# Patient Record
Sex: MALE | Race: WHITE | Employment: FULL TIME | URBAN - NONMETROPOLITAN AREA
[De-identification: names, ages, dates, MRNs, and addresses within clinical notes are randomized per-mention and may not be internally consistent; named-entity substitution may affect disease eponyms.]

---

## 2019-01-14 PROBLEM — Z12.11 COLON CANCER SCREENING: Status: ACTIVE | Noted: 2019-01-14

## 2019-02-13 ENCOUNTER — HOSPITAL ENCOUNTER (OUTPATIENT)
Age: 54
Discharge: HOME | End: 2019-02-13
Payer: COMMERCIAL

## 2019-02-13 PROBLEM — Z12.11 COLON CANCER SCREENING: Status: RESOLVED | Noted: 2019-01-14 | Resolved: 2019-02-13

## 2020-01-06 ENCOUNTER — HOSPITAL ENCOUNTER (OUTPATIENT)
Dept: NON INVASIVE DIAGNOSTICS | Age: 55
Discharge: HOME | End: 2020-01-06
Payer: COMMERCIAL

## 2020-01-06 ENCOUNTER — HOSPITAL ENCOUNTER (OUTPATIENT)
Dept: LAB | Age: 55
Discharge: HOME | End: 2020-01-06
Payer: COMMERCIAL

## 2020-01-06 LAB
ABSOLUTE EOS #: 0.1 K/UL (ref 0–0.44)
ABSOLUTE IMMATURE GRANULOCYTE: 0.04 K/UL (ref 0–0.3)
ABSOLUTE LYMPH #: 1.44 K/UL (ref 1.1–3.7)
ABSOLUTE MONO #: 0.48 K/UL (ref 0.1–1.2)
ANION GAP SERPL CALCULATED.3IONS-SCNC: 15 MMOL/L (ref 9–17)
BASOPHILS # BLD: 0 % (ref 0–2)
BASOPHILS ABSOLUTE: <0.03 K/UL (ref 0–0.2)
BUN BLDV-MCNC: 17 MG/DL (ref 6–20)
BUN/CREAT BLD: 20 (ref 9–20)
CALCIUM SERPL-MCNC: 9.6 MG/DL (ref 8.6–10.4)
CHLORIDE BLD-SCNC: 99 MMOL/L (ref 98–107)
CO2: 25 MMOL/L (ref 20–31)
CREAT SERPL-MCNC: 0.83 MG/DL (ref 0.7–1.2)
DIFFERENTIAL TYPE: ABNORMAL
EKG ATRIAL RATE: 74 BPM
EKG P AXIS: 31 DEGREES
EKG P-R INTERVAL: 172 MS
EKG Q-T INTERVAL: 382 MS
EKG QRS DURATION: 124 MS
EKG QTC CALCULATION (BAZETT): 424 MS
EKG R AXIS: -17 DEGREES
EKG T AXIS: 13 DEGREES
EKG VENTRICULAR RATE: 74 BPM
EOSINOPHILS RELATIVE PERCENT: 2 % (ref 1–4)
GFR AFRICAN AMERICAN: >60 ML/MIN
GFR NON-AFRICAN AMERICAN: >60 ML/MIN
GFR SERPL CREATININE-BSD FRML MDRD: ABNORMAL ML/MIN/{1.73_M2}
GFR SERPL CREATININE-BSD FRML MDRD: ABNORMAL ML/MIN/{1.73_M2}
GLUCOSE BLD-MCNC: 112 MG/DL (ref 70–99)
HCT VFR BLD CALC: 46 % (ref 40.7–50.3)
HEMOGLOBIN: 14.9 G/DL (ref 13–17)
IMMATURE GRANULOCYTES: 1 %
LYMPHOCYTES # BLD: 31 % (ref 24–43)
MCH RBC QN AUTO: 28.6 PG (ref 25.2–33.5)
MCHC RBC AUTO-ENTMCNC: 32.4 G/DL (ref 28.4–34.8)
MCV RBC AUTO: 88.3 FL (ref 82.6–102.9)
MONOCYTES # BLD: 10 % (ref 3–12)
NRBC AUTOMATED: 0 PER 100 WBC
PDW BLD-RTO: 13.2 % (ref 11.8–14.4)
PLATELET # BLD: 179 K/UL (ref 138–453)
PLATELET ESTIMATE: ABNORMAL
PMV BLD AUTO: 9.4 FL (ref 8.1–13.5)
POTASSIUM SERPL-SCNC: 4.7 MMOL/L (ref 3.7–5.3)
RBC # BLD: 5.21 M/UL (ref 4.21–5.77)
RBC # BLD: ABNORMAL 10*6/UL
SEG NEUTROPHILS: 56 % (ref 36–65)
SEGMENTED NEUTROPHILS ABSOLUTE COUNT: 2.54 K/UL (ref 1.5–8.1)
SODIUM BLD-SCNC: 139 MMOL/L (ref 135–144)
WBC # BLD: 4.6 K/UL (ref 3.5–11.3)
WBC # BLD: ABNORMAL 10*3/UL

## 2020-01-06 PROCEDURE — 36415 COLL VENOUS BLD VENIPUNCTURE: CPT

## 2020-01-06 PROCEDURE — 93010 ELECTROCARDIOGRAM REPORT: CPT | Performed by: FAMILY MEDICINE

## 2020-01-06 PROCEDURE — 80048 BASIC METABOLIC PNL TOTAL CA: CPT

## 2020-01-06 PROCEDURE — 93005 ELECTROCARDIOGRAM TRACING: CPT | Performed by: ORTHOPAEDIC SURGERY

## 2020-01-06 PROCEDURE — 85025 COMPLETE CBC W/AUTO DIFF WBC: CPT

## 2020-07-20 ENCOUNTER — HOSPITAL ENCOUNTER (OUTPATIENT)
Dept: LAB | Age: 55
Setting detail: SPECIMEN
Discharge: HOME | End: 2020-07-20
Payer: COMMERCIAL

## 2020-07-20 PROCEDURE — U0003 INFECTIOUS AGENT DETECTION BY NUCLEIC ACID (DNA OR RNA); SEVERE ACUTE RESPIRATORY SYNDROME CORONAVIRUS 2 (SARS-COV-2) (CORONAVIRUS DISEASE [COVID-19]), AMPLIFIED PROBE TECHNIQUE, MAKING USE OF HIGH THROUGHPUT TECHNOLOGIES AS DESCRIBED BY CMS-2020-01-R: HCPCS

## 2020-07-25 LAB — SARS-COV-2, NAA: NOT DETECTED

## 2020-07-26 ENCOUNTER — TELEPHONE (OUTPATIENT)
Dept: PRIMARY CARE CLINIC | Age: 55
End: 2020-07-26

## 2020-11-10 ENCOUNTER — HOSPITAL ENCOUNTER (OUTPATIENT)
Age: 55
Discharge: HOME | End: 2020-11-10
Payer: COMMERCIAL

## 2020-11-10 LAB
ABSOLUTE EOS #: 0.13 K/UL (ref 0–0.44)
ABSOLUTE IMMATURE GRANULOCYTE: 0.03 K/UL (ref 0–0.3)
ABSOLUTE LYMPH #: 1.42 K/UL (ref 1.1–3.7)
ABSOLUTE MONO #: 0.46 K/UL (ref 0.1–1.2)
ANION GAP SERPL CALCULATED.3IONS-SCNC: 11 MMOL/L (ref 9–17)
BASOPHILS # BLD: 1 % (ref 0–2)
BASOPHILS ABSOLUTE: 0.04 K/UL (ref 0–0.2)
BUN BLDV-MCNC: 13 MG/DL (ref 6–20)
BUN/CREAT BLD: 15 (ref 9–20)
CALCIUM SERPL-MCNC: 9.5 MG/DL (ref 8.6–10.4)
CHLORIDE BLD-SCNC: 99 MMOL/L (ref 98–107)
CO2: 24 MMOL/L (ref 20–31)
CREAT SERPL-MCNC: 0.88 MG/DL (ref 0.7–1.2)
DIFFERENTIAL TYPE: ABNORMAL
EKG ATRIAL RATE: 74 BPM
EKG P AXIS: 29 DEGREES
EKG P-R INTERVAL: 164 MS
EKG Q-T INTERVAL: 384 MS
EKG QRS DURATION: 114 MS
EKG QTC CALCULATION (BAZETT): 426 MS
EKG R AXIS: -17 DEGREES
EKG T AXIS: 14 DEGREES
EKG VENTRICULAR RATE: 74 BPM
EOSINOPHILS RELATIVE PERCENT: 3 % (ref 1–4)
GFR AFRICAN AMERICAN: >60 ML/MIN
GFR NON-AFRICAN AMERICAN: >60 ML/MIN
GFR SERPL CREATININE-BSD FRML MDRD: ABNORMAL ML/MIN/{1.73_M2}
GFR SERPL CREATININE-BSD FRML MDRD: ABNORMAL ML/MIN/{1.73_M2}
GLUCOSE BLD-MCNC: 108 MG/DL (ref 70–99)
HCT VFR BLD CALC: 45.1 % (ref 40.7–50.3)
HEMOGLOBIN: 14.8 G/DL (ref 13–17)
IMMATURE GRANULOCYTES: 1 %
LYMPHOCYTES # BLD: 30 % (ref 24–43)
MCH RBC QN AUTO: 28.7 PG (ref 25.2–33.5)
MCHC RBC AUTO-ENTMCNC: 32.8 G/DL (ref 28.4–34.8)
MCV RBC AUTO: 87.6 FL (ref 82.6–102.9)
MONOCYTES # BLD: 10 % (ref 3–12)
NRBC AUTOMATED: 0 PER 100 WBC
PDW BLD-RTO: 13.3 % (ref 11.8–14.4)
PLATELET # BLD: 203 K/UL (ref 138–453)
PLATELET ESTIMATE: ABNORMAL
PMV BLD AUTO: 8.9 FL (ref 8.1–13.5)
POTASSIUM SERPL-SCNC: 4.2 MMOL/L (ref 3.7–5.3)
RBC # BLD: 5.15 M/UL (ref 4.21–5.77)
RBC # BLD: ABNORMAL 10*6/UL
SEG NEUTROPHILS: 55 % (ref 36–65)
SEGMENTED NEUTROPHILS ABSOLUTE COUNT: 2.61 K/UL (ref 1.5–8.1)
SODIUM BLD-SCNC: 134 MMOL/L (ref 135–144)
WBC # BLD: 4.7 K/UL (ref 3.5–11.3)
WBC # BLD: ABNORMAL 10*3/UL

## 2020-11-10 PROCEDURE — 85025 COMPLETE CBC W/AUTO DIFF WBC: CPT

## 2020-11-10 PROCEDURE — 93010 ELECTROCARDIOGRAM REPORT: CPT | Performed by: INTERNAL MEDICINE

## 2020-11-10 PROCEDURE — 93005 ELECTROCARDIOGRAM TRACING: CPT | Performed by: ORTHOPAEDIC SURGERY

## 2020-11-10 PROCEDURE — 87641 MR-STAPH DNA AMP PROBE: CPT

## 2020-11-10 PROCEDURE — 80048 BASIC METABOLIC PNL TOTAL CA: CPT

## 2020-11-10 PROCEDURE — 36415 COLL VENOUS BLD VENIPUNCTURE: CPT

## 2020-11-11 LAB
MRSA, DNA, NASAL: NORMAL
SPECIMEN DESCRIPTION: NORMAL

## 2021-10-13 ENCOUNTER — APPOINTMENT (OUTPATIENT)
Dept: GENERAL RADIOLOGY | Age: 56
DRG: 200 | End: 2021-10-13
Payer: COMMERCIAL

## 2021-10-13 ENCOUNTER — APPOINTMENT (OUTPATIENT)
Dept: CT IMAGING | Age: 56
DRG: 200 | End: 2021-10-13
Payer: COMMERCIAL

## 2021-10-13 ENCOUNTER — HOSPITAL ENCOUNTER (INPATIENT)
Age: 56
LOS: 4 days | Discharge: HOME | DRG: 200 | End: 2021-10-17
Attending: EMERGENCY MEDICINE | Admitting: SURGERY
Payer: COMMERCIAL

## 2021-10-13 DIAGNOSIS — S27.0XXA TRAUMATIC PNEUMOTHORAX, INITIAL ENCOUNTER: ICD-10-CM

## 2021-10-13 DIAGNOSIS — S42.021A CLOSED DISPLACED FRACTURE OF SHAFT OF RIGHT CLAVICLE, INITIAL ENCOUNTER: ICD-10-CM

## 2021-10-13 DIAGNOSIS — S42.112A CLOSED DISPLACED FRACTURE OF BODY OF LEFT SCAPULA, INITIAL ENCOUNTER: ICD-10-CM

## 2021-10-13 DIAGNOSIS — J98.2 PNEUMOMEDIASTINUM (HCC): Primary | ICD-10-CM

## 2021-10-13 PROBLEM — S42.102A FRACTURE OF LEFT SCAPULA: Status: ACTIVE | Noted: 2021-10-13

## 2021-10-13 PROBLEM — T14.90XA TRAUMA: Status: ACTIVE | Noted: 2021-10-13

## 2021-10-13 PROBLEM — S22.42XA FRACTURE OF MULTIPLE RIBS OF LEFT SIDE: Status: ACTIVE | Noted: 2021-10-13

## 2021-10-13 PROBLEM — S02.92XA FACIAL FRACTURE (HCC): Status: ACTIVE | Noted: 2021-10-13

## 2021-10-13 PROBLEM — S42.001A FRACTURE OF RIGHT CLAVICLE: Status: ACTIVE | Noted: 2021-10-13

## 2021-10-13 PROCEDURE — 71045 X-RAY EXAM CHEST 1 VIEW: CPT

## 2021-10-13 PROCEDURE — 92523 SPEECH SOUND LANG COMPREHEN: CPT

## 2021-10-13 PROCEDURE — 6370000000 HC RX 637 (ALT 250 FOR IP): Performed by: STUDENT IN AN ORGANIZED HEALTH CARE EDUCATION/TRAINING PROGRAM

## 2021-10-13 PROCEDURE — 6360000002 HC RX W HCPCS

## 2021-10-13 PROCEDURE — 0W9B30Z DRAINAGE OF LEFT PLEURAL CAVITY WITH DRAINAGE DEVICE, PERCUTANEOUS APPROACH: ICD-10-PCS | Performed by: SURGERY

## 2021-10-13 PROCEDURE — 73200 CT UPPER EXTREMITY W/O DYE: CPT

## 2021-10-13 PROCEDURE — 6360000002 HC RX W HCPCS: Performed by: STUDENT IN AN ORGANIZED HEALTH CARE EDUCATION/TRAINING PROGRAM

## 2021-10-13 PROCEDURE — 2500000003 HC RX 250 WO HCPCS: Performed by: STUDENT IN AN ORGANIZED HEALTH CARE EDUCATION/TRAINING PROGRAM

## 2021-10-13 PROCEDURE — 2580000003 HC RX 258: Performed by: STUDENT IN AN ORGANIZED HEALTH CARE EDUCATION/TRAINING PROGRAM

## 2021-10-13 PROCEDURE — 73030 X-RAY EXAM OF SHOULDER: CPT

## 2021-10-13 PROCEDURE — 2060000002 HC BURN ICU INTERMEDIATE R&B

## 2021-10-13 PROCEDURE — 73000 X-RAY EXAM OF COLLAR BONE: CPT

## 2021-10-13 PROCEDURE — 76376 3D RENDER W/INTRP POSTPROCES: CPT

## 2021-10-13 PROCEDURE — 99284 EMERGENCY DEPT VISIT MOD MDM: CPT

## 2021-10-13 RX ORDER — ONDANSETRON 2 MG/ML
4 INJECTION INTRAMUSCULAR; INTRAVENOUS EVERY 6 HOURS PRN
Status: DISCONTINUED | OUTPATIENT
Start: 2021-10-13 | End: 2021-10-17 | Stop reason: HOSPADM

## 2021-10-13 RX ORDER — OXYCODONE HYDROCHLORIDE 5 MG/1
5 TABLET ORAL EVERY 8 HOURS PRN
Status: DISCONTINUED | OUTPATIENT
Start: 2021-10-13 | End: 2021-10-17

## 2021-10-13 RX ORDER — FENTANYL CITRATE 50 UG/ML
INJECTION, SOLUTION INTRAMUSCULAR; INTRAVENOUS
Status: COMPLETED
Start: 2021-10-13 | End: 2021-10-13

## 2021-10-13 RX ORDER — FENTANYL CITRATE 50 UG/ML
50 INJECTION, SOLUTION INTRAMUSCULAR; INTRAVENOUS ONCE
Status: COMPLETED | OUTPATIENT
Start: 2021-10-13 | End: 2021-10-13

## 2021-10-13 RX ORDER — AMOXICILLIN AND CLAVULANATE POTASSIUM 875; 125 MG/1; MG/1
1 TABLET, FILM COATED ORAL EVERY 12 HOURS SCHEDULED
Status: DISCONTINUED | OUTPATIENT
Start: 2021-10-13 | End: 2021-10-17 | Stop reason: HOSPADM

## 2021-10-13 RX ORDER — SODIUM CHLORIDE 0.9 % (FLUSH) 0.9 %
5-40 SYRINGE (ML) INJECTION PRN
Status: DISCONTINUED | OUTPATIENT
Start: 2021-10-13 | End: 2021-10-17 | Stop reason: HOSPADM

## 2021-10-13 RX ORDER — ONDANSETRON 4 MG/1
4 TABLET, ORALLY DISINTEGRATING ORAL EVERY 8 HOURS PRN
Status: DISCONTINUED | OUTPATIENT
Start: 2021-10-13 | End: 2021-10-17 | Stop reason: HOSPADM

## 2021-10-13 RX ORDER — METHOCARBAMOL 500 MG/1
750 TABLET, FILM COATED ORAL EVERY 8 HOURS
Status: DISCONTINUED | OUTPATIENT
Start: 2021-10-13 | End: 2021-10-17 | Stop reason: HOSPADM

## 2021-10-13 RX ORDER — GABAPENTIN 300 MG/1
300 CAPSULE ORAL EVERY 8 HOURS
Status: DISCONTINUED | OUTPATIENT
Start: 2021-10-13 | End: 2021-10-17 | Stop reason: HOSPADM

## 2021-10-13 RX ORDER — SODIUM CHLORIDE 9 MG/ML
25 INJECTION, SOLUTION INTRAVENOUS PRN
Status: DISCONTINUED | OUTPATIENT
Start: 2021-10-13 | End: 2021-10-17 | Stop reason: HOSPADM

## 2021-10-13 RX ORDER — POLYETHYLENE GLYCOL 3350 17 G/17G
17 POWDER, FOR SOLUTION ORAL DAILY
Status: DISCONTINUED | OUTPATIENT
Start: 2021-10-13 | End: 2021-10-17 | Stop reason: HOSPADM

## 2021-10-13 RX ORDER — SODIUM CHLORIDE 0.9 % (FLUSH) 0.9 %
5-40 SYRINGE (ML) INJECTION EVERY 12 HOURS SCHEDULED
Status: DISCONTINUED | OUTPATIENT
Start: 2021-10-13 | End: 2021-10-17 | Stop reason: HOSPADM

## 2021-10-13 RX ORDER — ACETAMINOPHEN 500 MG
1000 TABLET ORAL EVERY 8 HOURS
Status: DISCONTINUED | OUTPATIENT
Start: 2021-10-13 | End: 2021-10-17 | Stop reason: HOSPADM

## 2021-10-13 RX ADMIN — GABAPENTIN 300 MG: 300 CAPSULE ORAL at 11:48

## 2021-10-13 RX ADMIN — METHOCARBAMOL TABLETS 750 MG: 500 TABLET, COATED ORAL at 22:07

## 2021-10-13 RX ADMIN — KETAMINE HYDROCHLORIDE 0.2 MG/KG/HR: 50 INJECTION INTRAMUSCULAR; INTRAVENOUS at 04:56

## 2021-10-13 RX ADMIN — GABAPENTIN 300 MG: 300 CAPSULE ORAL at 04:33

## 2021-10-13 RX ADMIN — SODIUM CHLORIDE, PRESERVATIVE FREE 10 ML: 5 INJECTION INTRAVENOUS at 20:06

## 2021-10-13 RX ADMIN — METHOCARBAMOL TABLETS 750 MG: 500 TABLET, COATED ORAL at 04:33

## 2021-10-13 RX ADMIN — FENTANYL CITRATE 50 MCG: 50 INJECTION, SOLUTION INTRAMUSCULAR; INTRAVENOUS at 18:00

## 2021-10-13 RX ADMIN — FENTANYL CITRATE 50 MCG: 50 INJECTION, SOLUTION INTRAMUSCULAR; INTRAVENOUS at 19:53

## 2021-10-13 RX ADMIN — AMOXICILLIN AND CLAVULANATE POTASSIUM 1 TABLET: 875; 125 TABLET, FILM COATED ORAL at 11:47

## 2021-10-13 RX ADMIN — METHOCARBAMOL TABLETS 750 MG: 500 TABLET, COATED ORAL at 11:47

## 2021-10-13 RX ADMIN — GABAPENTIN 300 MG: 300 CAPSULE ORAL at 22:06

## 2021-10-13 RX ADMIN — ACETAMINOPHEN 1000 MG: 500 TABLET ORAL at 22:07

## 2021-10-13 RX ADMIN — FENTANYL CITRATE 50 MCG: 50 INJECTION INTRAMUSCULAR; INTRAVENOUS at 20:05

## 2021-10-13 RX ADMIN — AMOXICILLIN AND CLAVULANATE POTASSIUM 1 TABLET: 875; 125 TABLET, FILM COATED ORAL at 23:06

## 2021-10-13 RX ADMIN — ACETAMINOPHEN 1000 MG: 500 TABLET ORAL at 04:33

## 2021-10-13 ASSESSMENT — PAIN SCALES - GENERAL
PAINLEVEL_OUTOF10: 8
PAINLEVEL_OUTOF10: 8
PAINLEVEL_OUTOF10: 6
PAINLEVEL_OUTOF10: 8
PAINLEVEL_OUTOF10: 4

## 2021-10-13 ASSESSMENT — PAIN DESCRIPTION - ORIENTATION: ORIENTATION: LEFT;RIGHT

## 2021-10-13 ASSESSMENT — PAIN DESCRIPTION - LOCATION: LOCATION: RIB CAGE

## 2021-10-13 ASSESSMENT — PAIN DESCRIPTION - PROGRESSION: CLINICAL_PROGRESSION: NOT CHANGED

## 2021-10-13 ASSESSMENT — PAIN DESCRIPTION - FREQUENCY: FREQUENCY: CONTINUOUS

## 2021-10-13 ASSESSMENT — PAIN DESCRIPTION - PAIN TYPE: TYPE: ACUTE PAIN

## 2021-10-13 ASSESSMENT — PAIN DESCRIPTION - ONSET: ONSET: GRADUAL

## 2021-10-13 ASSESSMENT — PAIN DESCRIPTION - DESCRIPTORS: DESCRIPTORS: ACHING

## 2021-10-13 NOTE — PROGRESS NOTES
Therapy Prognosis  Prognosis: Good  Individuals consulted  Consulted and agree with results and recommendations: Patient; Family member    Education:  Patient Education: Yes  Patient Education Response: Verbalizes understanding          Therapy Time:   Individual Concurrent Group Co-treatment   Time In 2268         Time Out 9514         Minutes 11                Completed by: Gregory Rabago  Clinician    Cosigned By: Kerrie Low A.CCC/SLP       10/13/2021 12:23 PM

## 2021-10-13 NOTE — FLOWSHEET NOTE
Trauma   Assessment Approachable;Coping;Helplessness   Intervention Active listening;Explored feelings, thoughts, concerns;Explored coping resources;Nurtured hope;Sustaining presence/ Ministry of presence; Discussed belief system/Rastafari practices/carlos;Discussed illness/injury and it's impact   Outcome Comfort;Expressed gratitude;Engaged in conversation;Coping;Receptive   Spiritual/Yarsani   Type Spiritual support     Electronically signed by Jamey Pride on 10/13/2021 at 8:00 AM.  [facility]  [phone]

## 2021-10-13 NOTE — PROGRESS NOTES
CT scan reviewed. Lt thoracic cavity with increasing pneumo, fluid, as well as sub-q air. Will place chest tube. Discussed with team and patient.
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
## 2021-10-13 NOTE — PROCEDURES
PROCEDURE NOTE - CHEST TUBE PLACEMENT     PATIENT NAME: Sandeep Mejias [facility]  MEDICAL RECORD NO. [id number]  DATE: 10/13/2021  ATTENDING PHYSICIAN: Tiana    PREOPERATIVE DIAGNOSIS:  pneumothorax  POSTOPERATIVE DIAGNOSIS:  Same  PROCEDURE PERFORMED:  Emergency Chest Tube insertion  PERFORMING PHYSICIAN: Kiel Beal MD  COMPLICATIONS:  None      DISCUSSION:  Ashley Alvarenga is a 49y.o.-year-old male who requires chest tube placement due to  pneumothorax. The history and physical examination were reviewed and confirmed. CONSENT: The patient was counseled regarding the procedure, its indications, risks, potential complications and alternatives, and any questions were answered. Consent was obtained to proceed. PROCEDURE:  The patient was placed in a semirecumbent position with the head of the bed at 30 degrees. The left side was prepped with betadine and draped in a sterile fashion. Local anesthesia over the insertion site was obtained by infiltration using 1% Lidocaine without epinephrine. An incision was made laterally in the midaxillary line. Blunt dissection up and over the rib was performed until access was obtained into the pleural cavity. A 28 Thai chest tube was placed and connected to suction. Initial output from the tube was air. The tube was sutured in place and the site was covered with an occlusive dressing. All connections were banded. Breath sounds after the procedure were normal.  A chest x-ray was obtained to evaluate placement which was pending, but showed poor placement of chest tube. Chest tube removed, another incision made superiorly and a new 28 Thai tube was placed showing suboptimal position but functional.    The patient tolerated the procedure well. Kiel Beal MD  6:23 PM, 10/13/21    Present for procedure. F/U CXR pending. Addendum:  CXR reviewed chest tube appears to be out of chest.  Will replace and recheck CXR.     XR CHEST PORTABLE   Final Result Small bilateral effusions with left apical pneumothorax and left basilar   chest tube. Subcutaneous emphysema along the left lateral chest wall and in the neck   region bilaterally. XR CHEST PORTABLE   Final Result   Malposition chest tube removed from the left. Other 2 chest tubes on the   left unchanged. Extensive subcutaneous emphysema unchanged. No definite   pneumothorax. Mild interstitial infiltrates and small left effusion. Multiple rib fractures on the left and right clavicular fracture. XR CHEST PORTABLE   Final Result   Chest tube outside the chest wall cavity on the left. Questionable minute   left apical pneumothorax. Extensive subcutaneous emphysema decreases   sensitivity. Possible mild edema or pneumonitis. CT 3D RECONSTRUCTION   Final Result   1. Moderate left-sided pneumothorax with left apical, anterior, anterior   inferior, lateral and inferior components. Moderate left-sided   hydropneumothorax with moderate pleural fluid in the inferior left pleural   space. Compressive atelectasis in the left lower lobe, posterior left upper   lobe and lingula. Concurrent/underlying pulmonary contusion is also possible. 2. Multiple acute displaced left-sided rib fracture deformities involving the   anterior, anterolateral, lateral, posterolateral and posterior ribs. 3. Severely comminuted fracture at the junction of the left glenoid and   scapular body with extension of fracture components into the lateral left   scapular body. 4. Extensive subcutaneous emphysema throughout the left neck, chest wall and   left axilla. Pneumomediastinum. 5. Mild degenerative changes of the left AC and sternoclavicular joints. 6. Moderate to severe left glenohumeral osteoarthrosis. CT SHOULDER LEFT WO CONTRAST   Final Result   1. Moderate left-sided pneumothorax with left apical, anterior, anterior   inferior, lateral and inferior components.   Moderate left-sided fractures. Soft tissue emphysema at the base of the neck and left lateral chest wall. Likely left upper lung contusion not mentioned above. XR CHEST PORTABLE   Final Result   Possible left basilar pneumothorax on this supine image. Multiple visualized and suspected left rib fractures as well as comminuted   left scapular fracture. Soft tissue emphysema throughout the visualized base of neck and mid to upper   left chest wall.
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
## 2021-10-14 ENCOUNTER — APPOINTMENT (OUTPATIENT)
Dept: GENERAL RADIOLOGY | Age: 56
DRG: 200 | End: 2021-10-14
Payer: COMMERCIAL

## 2021-10-14 LAB
ABSOLUTE EOS #: <0.03 K/UL (ref 0–0.44)
ABSOLUTE IMMATURE GRANULOCYTE: <0.03 K/UL (ref 0–0.3)
ABSOLUTE LYMPH #: 0.97 K/UL (ref 1.1–3.7)
ABSOLUTE MONO #: 0.91 K/UL (ref 0.1–1.2)
ANION GAP SERPL CALCULATED.3IONS-SCNC: 11 MMOL/L (ref 9–17)
BASOPHILS # BLD: 0 % (ref 0–2)
BASOPHILS ABSOLUTE: 0.03 K/UL (ref 0–0.2)
BUN BLDV-MCNC: 11 MG/DL (ref 6–20)
BUN/CREAT BLD: ABNORMAL (ref 9–20)
CALCIUM SERPL-MCNC: 8.5 MG/DL (ref 8.6–10.4)
CHLORIDE BLD-SCNC: 104 MMOL/L (ref 98–107)
CO2: 21 MMOL/L (ref 20–31)
CREAT SERPL-MCNC: 0.7 MG/DL (ref 0.7–1.2)
DIFFERENTIAL TYPE: ABNORMAL
EOSINOPHILS RELATIVE PERCENT: 0 % (ref 1–4)
GFR AFRICAN AMERICAN: >60 ML/MIN
GFR NON-AFRICAN AMERICAN: >60 ML/MIN
GFR SERPL CREATININE-BSD FRML MDRD: ABNORMAL ML/MIN/{1.73_M2}
GFR SERPL CREATININE-BSD FRML MDRD: ABNORMAL ML/MIN/{1.73_M2}
GLUCOSE BLD-MCNC: 121 MG/DL (ref 70–99)
HCT VFR BLD CALC: 41.4 % (ref 40.7–50.3)
HEMOGLOBIN: 13.4 G/DL (ref 13–17)
IMMATURE GRANULOCYTES: 0 %
LYMPHOCYTES # BLD: 13 % (ref 24–43)
MCH RBC QN AUTO: 30.9 PG (ref 25.2–33.5)
MCHC RBC AUTO-ENTMCNC: 32.4 G/DL (ref 28.4–34.8)
MCV RBC AUTO: 95.6 FL (ref 82.6–102.9)
MONOCYTES # BLD: 12 % (ref 3–12)
NRBC AUTOMATED: 0 PER 100 WBC
PDW BLD-RTO: 13.7 % (ref 11.8–14.4)
PLATELET # BLD: 261 K/UL (ref 138–453)
PLATELET ESTIMATE: ABNORMAL
PMV BLD AUTO: 10.1 FL (ref 8.1–13.5)
POTASSIUM SERPL-SCNC: 4.2 MMOL/L (ref 3.7–5.3)
RBC # BLD: 4.33 M/UL (ref 4.21–5.77)
RBC # BLD: ABNORMAL 10*6/UL
SEG NEUTROPHILS: 75 % (ref 36–65)
SEGMENTED NEUTROPHILS ABSOLUTE COUNT: 5.74 K/UL (ref 1.5–8.1)
SODIUM BLD-SCNC: 136 MMOL/L (ref 135–144)
VITAMIN D 25-HYDROXY: 41.1 NG/ML (ref 30–100)
WBC # BLD: 7.7 K/UL (ref 3.5–11.3)
WBC # BLD: ABNORMAL 10*3/UL

## 2021-10-14 PROCEDURE — 97530 THERAPEUTIC ACTIVITIES: CPT

## 2021-10-14 PROCEDURE — 2580000003 HC RX 258: Performed by: STUDENT IN AN ORGANIZED HEALTH CARE EDUCATION/TRAINING PROGRAM

## 2021-10-14 PROCEDURE — 71045 X-RAY EXAM CHEST 1 VIEW: CPT

## 2021-10-14 PROCEDURE — 6360000002 HC RX W HCPCS

## 2021-10-14 PROCEDURE — 6370000000 HC RX 637 (ALT 250 FOR IP): Performed by: STUDENT IN AN ORGANIZED HEALTH CARE EDUCATION/TRAINING PROGRAM

## 2021-10-14 PROCEDURE — 97166 OT EVAL MOD COMPLEX 45 MIN: CPT

## 2021-10-14 PROCEDURE — 36415 COLL VENOUS BLD VENIPUNCTURE: CPT

## 2021-10-14 PROCEDURE — 82306 VITAMIN D 25 HYDROXY: CPT

## 2021-10-14 PROCEDURE — 97535 SELF CARE MNGMENT TRAINING: CPT

## 2021-10-14 PROCEDURE — 97162 PT EVAL MOD COMPLEX 30 MIN: CPT

## 2021-10-14 PROCEDURE — 2060000002 HC BURN ICU INTERMEDIATE R&B

## 2021-10-14 PROCEDURE — 85025 COMPLETE CBC W/AUTO DIFF WBC: CPT

## 2021-10-14 PROCEDURE — 2500000003 HC RX 250 WO HCPCS: Performed by: STUDENT IN AN ORGANIZED HEALTH CARE EDUCATION/TRAINING PROGRAM

## 2021-10-14 PROCEDURE — 80048 BASIC METABOLIC PNL TOTAL CA: CPT

## 2021-10-14 PROCEDURE — 99252 IP/OBS CONSLTJ NEW/EST SF 35: CPT | Performed by: ORTHOPAEDIC SURGERY

## 2021-10-14 RX ADMIN — METHOCARBAMOL TABLETS 750 MG: 500 TABLET, COATED ORAL at 21:22

## 2021-10-14 RX ADMIN — GABAPENTIN 300 MG: 300 CAPSULE ORAL at 21:23

## 2021-10-14 RX ADMIN — OXYCODONE 5 MG: 5 TABLET ORAL at 03:32

## 2021-10-14 RX ADMIN — AMOXICILLIN AND CLAVULANATE POTASSIUM 1 TABLET: 875; 125 TABLET, FILM COATED ORAL at 08:48

## 2021-10-14 RX ADMIN — SODIUM CHLORIDE, PRESERVATIVE FREE 10 ML: 5 INJECTION INTRAVENOUS at 08:48

## 2021-10-14 RX ADMIN — GABAPENTIN 300 MG: 300 CAPSULE ORAL at 03:33

## 2021-10-14 RX ADMIN — POLYETHYLENE GLYCOL 3350 17 G: 17 POWDER, FOR SOLUTION ORAL at 08:48

## 2021-10-14 RX ADMIN — METHOCARBAMOL TABLETS 750 MG: 500 TABLET, COATED ORAL at 03:33

## 2021-10-14 RX ADMIN — ENOXAPARIN SODIUM 30 MG: 30 INJECTION SUBCUTANEOUS at 21:22

## 2021-10-14 RX ADMIN — KETAMINE HYDROCHLORIDE 0.2 MG/KG/HR: 50 INJECTION INTRAMUSCULAR; INTRAVENOUS at 14:45

## 2021-10-14 RX ADMIN — ACETAMINOPHEN 1000 MG: 500 TABLET ORAL at 21:22

## 2021-10-14 RX ADMIN — ENOXAPARIN SODIUM 30 MG: 30 INJECTION SUBCUTANEOUS at 11:02

## 2021-10-14 RX ADMIN — GABAPENTIN 300 MG: 300 CAPSULE ORAL at 12:51

## 2021-10-14 RX ADMIN — OXYCODONE 5 MG: 5 TABLET ORAL at 14:45

## 2021-10-14 RX ADMIN — METHOCARBAMOL TABLETS 750 MG: 500 TABLET, COATED ORAL at 14:46

## 2021-10-14 RX ADMIN — SODIUM CHLORIDE, PRESERVATIVE FREE 10 ML: 5 INJECTION INTRAVENOUS at 21:23

## 2021-10-14 RX ADMIN — ACETAMINOPHEN 1000 MG: 500 TABLET ORAL at 12:51

## 2021-10-14 RX ADMIN — ACETAMINOPHEN 1000 MG: 500 TABLET ORAL at 03:34

## 2021-10-14 RX ADMIN — AMOXICILLIN AND CLAVULANATE POTASSIUM 1 TABLET: 875; 125 TABLET, FILM COATED ORAL at 21:23

## 2021-10-14 ASSESSMENT — PAIN SCALES - GENERAL
PAINLEVEL_OUTOF10: 7
PAINLEVEL_OUTOF10: 6
PAINLEVEL_OUTOF10: 2
PAINLEVEL_OUTOF10: 1
PAINLEVEL_OUTOF10: 7
PAINLEVEL_OUTOF10: 7

## 2021-10-14 ASSESSMENT — PAIN DESCRIPTION - LOCATION: LOCATION: RIB CAGE

## 2021-10-14 ASSESSMENT — PAIN DESCRIPTION - ORIENTATION
ORIENTATION: RIGHT;LEFT
ORIENTATION: LEFT

## 2021-10-14 ASSESSMENT — PAIN DESCRIPTION - FREQUENCY
FREQUENCY: CONTINUOUS
FREQUENCY: CONTINUOUS

## 2021-10-14 ASSESSMENT — PAIN DESCRIPTION - ONSET
ONSET: ON-GOING
ONSET: GRADUAL

## 2021-10-14 ASSESSMENT — PAIN DESCRIPTION - PAIN TYPE: TYPE: ACUTE PAIN

## 2021-10-14 ASSESSMENT — PAIN DESCRIPTION - DESCRIPTORS
DESCRIPTORS: ACHING;DISCOMFORT
DESCRIPTORS: ACHING;DISCOMFORT

## 2021-10-14 NOTE — CARE COORDINATION
SBIRT complete. Met with pt to complete assessment. Pt admits to drinking a case of beer each weekend. Pt states that the amount he consumes has increased over the last 6 months. He is unsure why amount has increased other than pt states that he drinks when he is doing projects around the house and he has had more projects lately. Pt denies other drug use and denies any signs/symptoms of depression. Pt states he was on anxiety medication in 2001 but is no longer taking any medication. Pt is aware that he has been drinking to much and states he is planning to decrease the amount on his own. Pt is not interested in tx resources at this time. Pt denies past tx or involvement with AA meetings. Patients daughter and girlfriend also present during conversation and pt was agreeable to them remaining in the room during the assessment. Patients girlfriend has been concerned with his increase in alcohol consumption and will assist him with getting into treatment if pt is unable to decrease the amount on his own. They are aware of resources in the [facility] area and were not interested in tx list.          Alcohol Screening and Brief Intervention        No results for input(s): ALC in the last 72 hours.     Alcohol Pre-screening  (MEN ONLY) How many times in the past year have you had 5 or more drinks in a day?: 1 or more       Alcohol Screening Audit  TOTAL SCORE[de-identified] 19    Drug Pre-Screening   How many times in the past year have you used a recreational drug or used a prescription medication for nonmedical reasons?: None    Drug Screening DAST       Mood Pre-Screening (PHQ-2)  During the past two weeks, have you been bothered by little interest or pleasure in doing things?: No  During the past two weeks, have you been bothered by feeling down, depressed, or hopeless?: No    Mood Pre-Screening (PHQ-9)         I have interviewed Елена Jaime, [phone] regarding  His alcohol consumption/drug use and risk for excessive use. Screenings were positive. Patient  Declined intervention at this time.      Deferred []    Completed on: 10/14/2021   ROSIBEL Llanos
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
## 2021-10-14 NOTE — PLAN OF CARE
Problem: Falls - Risk of:  Goal: Will remain free from falls  Description: Will remain free from falls  Outcome: Met This Shift  Goal: Absence of physical injury  Description: Absence of physical injury  Outcome: Met This Shift     Problem: Tissue Perfusion - Cardiopulmonary, Altered:  Goal: Absence of angina  Description: Absence of angina  Outcome: Met This Shift     Problem: Pain:  Goal: Pain level will decrease  Description: Pain level will decrease  Outcome: Ongoing  Goal: Control of acute pain  Description: Control of acute pain  Outcome: Ongoing     Problem: Tissue Perfusion - Cardiopulmonary, Altered:  Goal: Hemodynamic stability will improve  Description: Hemodynamic stability will improve  Outcome: Ongoing
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
## 2021-10-14 NOTE — PLAN OF CARE
Problem: Pain:  Goal: Pain level will decrease  Description: Pain level will decrease  10/14/2021 1648 by Nicolasa Martinez RN  Outcome: Ongoing  10/14/2021 0517 by Miguel Perry RN  Outcome: Ongoing  Goal: Control of acute pain  Description: Control of acute pain  10/14/2021 0517 by Miguel Perry RN  Outcome: Ongoing     Problem: Falls - Risk of:  Goal: Will remain free from falls  Description: Will remain free from falls  10/14/2021 1648 by Nicolasa Martinez RN  Outcome: Ongoing  10/14/2021 0517 by Miguel Perry RN  Outcome: Met This Shift  Goal: Absence of physical injury  Description: Absence of physical injury  10/14/2021 1648 by Nicolasa Martinez RN  Outcome: Ongoing  10/14/2021 0517 by Miguel Perry RN  Outcome: Met This Shift
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
## 2021-10-15 ENCOUNTER — APPOINTMENT (OUTPATIENT)
Dept: GENERAL RADIOLOGY | Age: 56
DRG: 200 | End: 2021-10-15
Payer: COMMERCIAL

## 2021-10-15 LAB
ABSOLUTE EOS #: 0.06 K/UL (ref 0–0.44)
ABSOLUTE IMMATURE GRANULOCYTE: <0.03 K/UL (ref 0–0.3)
ABSOLUTE LYMPH #: 1.21 K/UL (ref 1.1–3.7)
ABSOLUTE MONO #: 0.73 K/UL (ref 0.1–1.2)
ANION GAP SERPL CALCULATED.3IONS-SCNC: 11 MMOL/L (ref 9–17)
BASOPHILS # BLD: 1 % (ref 0–2)
BASOPHILS ABSOLUTE: 0.04 K/UL (ref 0–0.2)
BUN BLDV-MCNC: 9 MG/DL (ref 6–20)
BUN/CREAT BLD: ABNORMAL (ref 9–20)
CALCIUM SERPL-MCNC: 8.9 MG/DL (ref 8.6–10.4)
CHLORIDE BLD-SCNC: 101 MMOL/L (ref 98–107)
CO2: 23 MMOL/L (ref 20–31)
CREAT SERPL-MCNC: 0.62 MG/DL (ref 0.7–1.2)
DIFFERENTIAL TYPE: ABNORMAL
EOSINOPHILS RELATIVE PERCENT: 1 % (ref 1–4)
GFR AFRICAN AMERICAN: >60 ML/MIN
GFR NON-AFRICAN AMERICAN: >60 ML/MIN
GFR SERPL CREATININE-BSD FRML MDRD: ABNORMAL ML/MIN/{1.73_M2}
GFR SERPL CREATININE-BSD FRML MDRD: ABNORMAL ML/MIN/{1.73_M2}
GLUCOSE BLD-MCNC: 112 MG/DL (ref 70–99)
HCT VFR BLD CALC: 40 % (ref 40.7–50.3)
HEMOGLOBIN: 12.8 G/DL (ref 13–17)
IMMATURE GRANULOCYTES: 0 %
LYMPHOCYTES # BLD: 17 % (ref 24–43)
MCH RBC QN AUTO: 29.3 PG (ref 25.2–33.5)
MCHC RBC AUTO-ENTMCNC: 32 G/DL (ref 28.4–34.8)
MCV RBC AUTO: 91.5 FL (ref 82.6–102.9)
MONOCYTES # BLD: 10 % (ref 3–12)
NRBC AUTOMATED: 0 PER 100 WBC
PDW BLD-RTO: 13.6 % (ref 11.8–14.4)
PLATELET # BLD: 156 K/UL (ref 138–453)
PLATELET ESTIMATE: ABNORMAL
PMV BLD AUTO: 9.2 FL (ref 8.1–13.5)
POTASSIUM SERPL-SCNC: 4.3 MMOL/L (ref 3.7–5.3)
RBC # BLD: 4.37 M/UL (ref 4.21–5.77)
RBC # BLD: ABNORMAL 10*6/UL
SEG NEUTROPHILS: 71 % (ref 36–65)
SEGMENTED NEUTROPHILS ABSOLUTE COUNT: 4.93 K/UL (ref 1.5–8.1)
SODIUM BLD-SCNC: 135 MMOL/L (ref 135–144)
WBC # BLD: 7 K/UL (ref 3.5–11.3)
WBC # BLD: ABNORMAL 10*3/UL

## 2021-10-15 PROCEDURE — 85025 COMPLETE CBC W/AUTO DIFF WBC: CPT

## 2021-10-15 PROCEDURE — 97116 GAIT TRAINING THERAPY: CPT

## 2021-10-15 PROCEDURE — 97530 THERAPEUTIC ACTIVITIES: CPT

## 2021-10-15 PROCEDURE — 71045 X-RAY EXAM CHEST 1 VIEW: CPT

## 2021-10-15 PROCEDURE — 6360000002 HC RX W HCPCS

## 2021-10-15 PROCEDURE — 97535 SELF CARE MNGMENT TRAINING: CPT

## 2021-10-15 PROCEDURE — 2060000002 HC BURN ICU INTERMEDIATE R&B

## 2021-10-15 PROCEDURE — 6370000000 HC RX 637 (ALT 250 FOR IP): Performed by: STUDENT IN AN ORGANIZED HEALTH CARE EDUCATION/TRAINING PROGRAM

## 2021-10-15 PROCEDURE — 80048 BASIC METABOLIC PNL TOTAL CA: CPT

## 2021-10-15 PROCEDURE — 36415 COLL VENOUS BLD VENIPUNCTURE: CPT

## 2021-10-15 PROCEDURE — 2580000003 HC RX 258: Performed by: STUDENT IN AN ORGANIZED HEALTH CARE EDUCATION/TRAINING PROGRAM

## 2021-10-15 RX ADMIN — ACETAMINOPHEN 1000 MG: 500 TABLET ORAL at 22:01

## 2021-10-15 RX ADMIN — GABAPENTIN 300 MG: 300 CAPSULE ORAL at 22:01

## 2021-10-15 RX ADMIN — ACETAMINOPHEN 1000 MG: 500 TABLET ORAL at 12:45

## 2021-10-15 RX ADMIN — ENOXAPARIN SODIUM 30 MG: 30 INJECTION SUBCUTANEOUS at 08:31

## 2021-10-15 RX ADMIN — AMOXICILLIN AND CLAVULANATE POTASSIUM 1 TABLET: 875; 125 TABLET, FILM COATED ORAL at 08:31

## 2021-10-15 RX ADMIN — AMOXICILLIN AND CLAVULANATE POTASSIUM 1 TABLET: 875; 125 TABLET, FILM COATED ORAL at 22:01

## 2021-10-15 RX ADMIN — GABAPENTIN 300 MG: 300 CAPSULE ORAL at 12:46

## 2021-10-15 RX ADMIN — SODIUM CHLORIDE, PRESERVATIVE FREE 10 ML: 5 INJECTION INTRAVENOUS at 22:00

## 2021-10-15 RX ADMIN — ENOXAPARIN SODIUM 30 MG: 30 INJECTION SUBCUTANEOUS at 22:00

## 2021-10-15 RX ADMIN — OXYCODONE 5 MG: 5 TABLET ORAL at 08:31

## 2021-10-15 RX ADMIN — OXYCODONE 5 MG: 5 TABLET ORAL at 17:39

## 2021-10-15 RX ADMIN — POLYETHYLENE GLYCOL 3350 17 G: 17 POWDER, FOR SOLUTION ORAL at 08:31

## 2021-10-15 RX ADMIN — METHOCARBAMOL TABLETS 750 MG: 500 TABLET, COATED ORAL at 12:45

## 2021-10-15 RX ADMIN — METHOCARBAMOL TABLETS 750 MG: 500 TABLET, COATED ORAL at 22:01

## 2021-10-15 ASSESSMENT — PAIN DESCRIPTION - PROGRESSION
CLINICAL_PROGRESSION: NOT CHANGED

## 2021-10-15 ASSESSMENT — PAIN DESCRIPTION - DESCRIPTORS
DESCRIPTORS: ACHING;DISCOMFORT;SORE;SHARP
DESCRIPTORS: ACHING;CONSTANT;SORE

## 2021-10-15 ASSESSMENT — PAIN SCALES - GENERAL
PAINLEVEL_OUTOF10: 4
PAINLEVEL_OUTOF10: 4
PAINLEVEL_OUTOF10: 8
PAINLEVEL_OUTOF10: 1
PAINLEVEL_OUTOF10: 0
PAINLEVEL_OUTOF10: 8
PAINLEVEL_OUTOF10: 0
PAINLEVEL_OUTOF10: 3
PAINLEVEL_OUTOF10: 7
PAINLEVEL_OUTOF10: 1

## 2021-10-15 ASSESSMENT — PAIN DESCRIPTION - ORIENTATION
ORIENTATION: LEFT;RIGHT
ORIENTATION: RIGHT;LEFT

## 2021-10-15 ASSESSMENT — PAIN DESCRIPTION - LOCATION
LOCATION: RIB CAGE;SHOULDER
LOCATION: RIB CAGE;SHOULDER

## 2021-10-15 ASSESSMENT — PAIN DESCRIPTION - PAIN TYPE
TYPE: ACUTE PAIN
TYPE: ACUTE PAIN

## 2021-10-15 ASSESSMENT — PAIN DESCRIPTION - FREQUENCY
FREQUENCY: CONTINUOUS
FREQUENCY: CONTINUOUS
FREQUENCY: INTERMITTENT

## 2021-10-15 NOTE — PROGRESS NOTES
Physical Therapy  Facility/Department: [address] BURN UNIT  Daily Treatment Note  NAME: Janae Contreras  : 1965  MRN: [id number]    Date of Service: 10/15/2021    Discharge Recommendations:  Patient would benefit from continued therapy after discharge    Assessment   Body structures, Functions, Activity limitations: Decreased functional mobility ; Decreased balance;Decreased ADL status; Decreased high-level IADLs;Decreased endurance;Decreased strength  Assessment: Pt ambulates 100 ft with multiuple standing rest breaks d/t fatigue and pain with CGA and no AD. [place] Pt significantly limited by endurance and pain levels at this time and is unsafe to return to prior living situation d/t high fall risk from decreased balance and endurance. Pt would benefit from intensive therapy to promote endurance, balance, and strenghtening. Prognosis: Good  PT Education: Goals;PT Role;Plan of Care;Precautions;Weight-bearing Education;General Safety;Gait Training;Functional Mobility Training;Transfer Training  Patient Education: Educated on safety with Transfer. REQUIRES PT FOLLOW UP: Yes  Activity Tolerance  Activity Tolerance: Patient limited by endurance; Patient limited by fatigue;Patient limited by pain     Patient Diagnosis(es): There were no encounter diagnoses. has no past medical history on file. has no past surgical history on file.     Restrictions  Restrictions/Precautions  Restrictions/Precautions: Weight Bearing  Required Braces or Orthoses?: Yes  Upper Extremity Weight Bearing Restrictions  Right Upper Extremity Weight Bearing: Non Weight Bearing  Left Upper Extremity Weight Bearing: Weight Bearing As Tolerated  Other: BUE sling for comfort only, not present in room RN notified  Required Braces or Orthoses  Right Upper Extremity Brace/Splint: Sling  Left Upper Extremity Brace/Splint: Sling  Position Activity Restriction  Other position/activity restrictions: Up w/A, sinus precautions (no straws), s/p facial fx, R clavical fx, L scapula fx, 1,2,5 R rib fx. Chest tube. Subjective   General  Response To Previous Treatment: Patient with no complaints from previous session. Family / Caregiver Present: Yes (Brother .)  Subjective  Subjective: RN and pt agreeable to PT. pt agreeable and pleasant. pt up in chair upon arrival , family in room . Pain Screening  Patient Currently in Pain: Yes  Pain Assessment  Pain Type: Acute pain  Pain Location: Rib cage; Shoulder  Pain Orientation: Right;Left  Pain Descriptors: Aching;Constant; Sore  Pain Frequency: Continuous  Clinical Progression: Not changed  Non-Pharmaceutical Pain Intervention(s): Relaxation techniques;Repositioned; Ambulation/Increased Activity  Response to Pain Intervention: Patient Satisfied  Vital Signs  Patient Currently in Pain: Yes       Orientation  Orientation  Overall Orientation Status: Within Functional Limits  Cognition      Objective   Bed mobility  Sit to Supine: Unable to assess (Pt up in chair upon arrival and exit.)  Transfers  Sit to Stand: Minimal Assistance;2 Person Assistance  Stand to sit: Minimal Assistance;2 Person Assistance  Comment: STS x4 performed to Improve on safety with Transfer . Cues to maintain NWB BUE during STS and stand to sit with good return. Ambulation  Ambulation?: Yes  More Ambulation?: No  Ambulation 1  Surface: level tile  Device: No Device  Other Apparatus:  (On roome air)  Assistance: Contact guard assistance  Quality of Gait: requiring Rest every 8-10ft. Unsteady , High steppage  R Leg. Gait Deviations: Slow Anjali;Decreased step length;Decreased step height  Distance: 100ft  Comments: Standinging rest breaks x5. d/t fatigue .  Pt very motivated but is limited by SOB/ pain  and Fatigue     Balance  Posture: Good  Sitting - Static: Good  Sitting - Dynamic: Good;-  Standing - Static: Fair  Standing - Dynamic: Fair  Comments: Assessed without AD  Exercises  Knee Long Arc Quad: 10 reps  Ankle Pumps: 10 reps  Comments: Sit to stand x5.       AM-PAC Score  AM-PAC Inpatient Mobility Raw Score : 15 (10/15/21 1206)  AM-PAC Inpatient T-Scale Score : 39.45 (10/15/21 1206)  Mobility Inpatient CMS 0-100% Score: 57.7 (10/15/21 1206)  Mobility Inpatient CMS G-Code Modifier : CK (10/15/21 1206)          Goals  Short term goals  Time Frame for Short term goals: 14 visits  Short term goal 1: Complete bed mobility with mod I NWB BUE  Short term goal 2: Complete transfers with mod I NWB BUE  Short term goal 3: Complete 300 ft of gait with mod I NWB BUE  Short term goal 4: Participate in 30 minutes of therapy to promote endurance    Plan    Plan  Times per week: 6-7x  Current Treatment Recommendations: Strengthening, Transfer Training, Endurance Training, Neuromuscular Re-education, Patient/Caregiver Education & Training, ADL/Self-care Training, Equipment Evaluation, Education, & procurement, Balance Training, IADL Training, Gait Training, Home Exercise Program, Functional Mobility Training, Stair training, Safety Education & Training  Safety Devices  Type of devices:  All fall risk precautions in place, Gait belt, Call light within reach, Left in bed, Left in chair, Nurse notified     Therapy Time   Individual Concurrent Group Co-treatment   Time In 1116         Time Out 1152         Minutes 1220 [facility]

## 2021-10-15 NOTE — PROGRESS NOTES
Orthopedic Progress Note    Patient:  Adrian Infante  YOB: 1965     49 y.o. male    Subjective:  Patient seen and examined   Continued mild dysesthesias to the right small finger  No issue overnight  Pain controlled  Denies fever, HA, CP, SOB, N/V, dysuria  PT/OT evaluation yesterday    Vitals reviewed, afebrile    Objective:   Vitals:    10/15/21 0415   BP: (!) 145/81   Pulse: 69   Resp: 23   Temp: 98 °F (36.7 °C)   SpO2: 92%     Gen: NAD, cooperative     Cardiovascular: Regular rate    Respiratory: Chest symmetric, no accessory muscle use       RUE: Superficial abrasions throughout the extremity. No obvious bony deformity. Mild tenderness to palpation about the mid-clavicle with no radiation of pain or TTP throughout the extremity. Pain with AROM of the shoulder. Compartments soft and easily compressible. Mild dysesthesia to the little finger. Ulnar/media/AIN/PIN/radial motor intact. Axillary/MCN/median/ulnar/radial nerves SILT. Radial pulse 2+ with BCR.     LUE: Superficial abrasions throughout the extremity.  No obvious bony deformity. TTP about the scapula, no TTP throughout the remaining extremity.  Unable to perform ROM exam 2/2 pain. No pain with ROM of remaining joints. Compartments soft and compressible. Ulnar/median/AIN/PIN/radial motor intact. Axillary/MCN/median/ulnar/radial nerves SILT. Radial pulse 2+ with BCR. Recent Labs     10/15/21  0341   WBC 7.0   HGB 12.8*   HCT 40.0*         K 4.3   BUN 9   CREATININE 0.62*   GLUCOSE 112*      Anticoag: Lovenox  ABX: Augmentin  See rec for complete list    Impression/plan: 49 y. o. male with a shelter on 10/13 being seen for the following orthopedic injuries:     - Right clavicle fracture  - Left scapula fracture    -No operative intervention for injuries listed  -Sling at bedside  -OK to eat from ortho perspective   -WB status: NWB bilateral upper extremities  -Pain control per primary  -ABX/DVT ppx: per primary  -Ice (20 min, 1 hour off) for edema/pain control  -Continue working with PT/OT  -F/u with Dr. Gilford Sievert 7-10 days after injury  -Please page ortho with any questions    Gustavo Musa DO  Orthopedic Surgery Resident, PGY-1  [place] [id number], [facility]

## 2021-10-15 NOTE — PROGRESS NOTES
PROGRESS NOTE          PATIENT NAME: Mariel Webb  MEDICAL RECORD NO. [id number]  DATE: 10/15/2021  SURGEON: Dr. Steen Shall: No primary care provider on file. HD: # 2    ASSESSMENT    Patient Active Problem List   Diagnosis    Trauma    Fracture of right clavicle    Fracture of left scapula    Traumatic pneumothorax    Fracture of multiple ribs of left side    Pneumomediastinum (HCC)    Facial fracture [facility])       MEDICAL DECISION MAKING AND PLAN    Multiple Rib fractures-right 1,2,5, left 5  L pneumothorax   Chest tube in place-will put to water seal today   CXR tomorrow   Encourage IS    L orbital wall fracture  L zygomatic process fracture  L maxillary sinus wall fracture   Plastics consulted-sinus precautions, Augmentin    Right clavicle fracture  L scapula fracture   Ortho consulted   Sling BUE   NWB BUE    Pain management   Continue Tylenol, neurontin, robaxin   Shannan PRN   DC ketamine gtt    DVT proph   Continue Lovenox    GI   Continue general diet   Continue glycolax    Dispo   PT/OT      Chief Complaint: \"I'm sore\"    SUBJECTIVE    Mariel Webb was seen and examined at bedside. Patient sitting up to chair with family in room. Patient states he is feeling okay. States he continues to have some amount of pain. States he has been working with his incentive spirometer. OBJECTIVE  VITALS: Temp: Temp: 98.4 °F (36.9 °C)Temp  Av.2 °F (36.8 °C)  Min: 98 °F (36.7 °C)  Max: 98.4 °F (00.2 °C) BP Systolic (56VQQ), HZV:116 , Min:135 , CCD:496   Diastolic (13EKT), ACF:96, Min:81, Max:93   Pulse Pulse  Av.2  Min: 65  Max: 69 Resp Resp  Av.7  Min: 16  Max: 28 Pulse ox SpO2  Av.2 %  Min: 90 %  Max: 97 %  GENERAL: alert, no distress  NEURO: oriented  HEENT: pupils equal round and reactive  LUNGS: unlabored. L sided chest tube intact.   HEART: normal rate and regular rhythm  ABDOMEN: soft, non-tender, non-distended, bowel sounds present in all 4 quadrants and no guarding or peritoneal signs present  EXTREMITY: BUE slings in place    I/O last 3 completed shifts: In: 720 [P.O.:720]  Out: 1310 [Urine:1250; Chest Tube:60]    Drain/tube output: In: -   Out: 1310 [Urine:1250]    LAB:  CBC:   Recent Labs     10/14/21  0750 10/15/21  0341   WBC 7.7 7.0   HGB 13.4 12.8*   HCT 41.4 40.0*   MCV 95.6 91.5    156     BMP:   Recent Labs     10/14/21  0750 10/15/21  0341    135   K 4.2 4.3    101   CO2 21 23   BUN 11 9   CREATININE 0.70 0.62*   GLUCOSE 121* 112*     COAGS: No results for input(s): APTT, PROT, INR in the last 72 hours. RADIOLOGY:  CXR: 10/15  Impression   Bibasilar effusions with trace left apical pneumothorax.       Subcutaneous emphysema along the left lateral chest wall and in the   supraclavicular regions.            TAMMIE Olson - CNP  10/15/21, 12:10 PM

## 2021-10-15 NOTE — PROGRESS NOTES
Occupational Therapy  Facility/Department: [address] BURN UNIT  Daily Treatment Note  NAME: Kriss Cormier  : 1965  MRN: [id number]    Date of Service: 10/15/2021    Discharge Recommendations:  Patient would benefit from continued therapy after discharge  Assessment   Performance deficits / Impairments: Decreased functional mobility ; Decreased ADL status; Decreased strength;Decreased balance;Decreased high-level IADLs;Decreased safe awareness;Decreased ROM  Assessment: Pt may benefit from continued acute OT services to address increased pain, decreased balance, decreased safety awareness, and decreased functional mobility while implementing compensatory strategies for precautions impacting engagement in ADLs/IADLs/functional tasks to improve carryover at d/c. Prognosis: Good  Patient Education: OT Role, OT POC, NWB precautions, Incentive Spirometer, Pain management/breathing tech, transfer training, safety awareness - good return  REQUIRES OT FOLLOW UP: Yes  Safety Devices  Safety Devices in place: Yes  Type of devices: Left in chair;Call light within reach;Nurse notified  Restraints  Initially in place: No     Patient Diagnosis(es): There were no encounter diagnoses. has no past medical history on file. has no past surgical history on file. Restrictions  Restrictions/Precautions  Restrictions/Precautions: Weight Bearing  Required Braces or Orthoses?: Yes  Upper Extremity Weight Bearing Restrictions  Right Upper Extremity Weight Bearing: Non Weight Bearing  Left Upper Extremity Weight Bearing: Non Weight Bearing  Other: BUE sling for comfort only, not present in room RN notified  Required Braces or Orthoses  Right Upper Extremity Brace/Splint: Sling (For comfort)  Left Upper Extremity Brace/Splint: Sling (For comfort.)  Position Activity Restriction  Other position/activity restrictions: Up w/A, sinus precautions (no straws), s/p facial fx, R clavical fx, L scapula fx, 1,2,5 R rib fx.  Chest

## 2021-10-15 NOTE — DISCHARGE INSTR - COC
Continuity of Care Form    Patient Name: Ashley Alvarenga   :  1965  MRN:  [id number]    Admit date:  10/13/2021  Discharge date:  ***    Code Status Order: Full Code   Advance Directives:      Admitting Physician:  Bennett Smith MD  PCP: No primary care provider on file. Discharging Nurse: [facility]/Room#: 7369/6390-26  Discharging Unit Phone Number: ***    Emergency Contact:   Extended Emergency Contact Information  Primary Emergency Contact: [address], [address] Phone: [phone]  Relation: Other  [facility] [address]  Phone: [phone]  Relation: Child    Past Surgical History:  No past surgical history on file. Immunization History:   Immunization History   Administered Date(s) Administered    COVID-19, J&J, PF, 0.5 mL 2021       Active Problems:  Patient Active Problem List   Diagnosis Code    Trauma T14.90XA    Fracture of right clavicle S42.001A    Fracture of left scapula S42.102A    Traumatic pneumothorax S27. 0XXA    Fracture of multiple ribs of left side S22.42XA    Pneumomediastinum (HCC) J98.2    Facial fracture (HCC) S02. 92XA       Isolation/Infection:   Isolation            No Isolation          Patient Infection Status       Infection Onset Added Last Indicated Last Indicated By Review Planned Expiration Resolved Resolved By    None active    Resolved    COVID-19 Rule Out 20 COVID-19 Ambulatory (Ordered)   20 Rule-Out Test Resulted            Nurse Assessment:  Last Vital Signs: /89   Pulse 74   Temp 98.8 °F (37.1 °C) (Oral)   Resp 20   Ht 6' (1.829 m)   Wt 230 lb (104.3 kg)   SpO2 97%   BMI 31.19 kg/m²     Last documented pain score (0-10 scale): Pain Level: 4  Last Weight:   Wt Readings from Last 1 Encounters:   10/13/21 230 lb (104.3 kg)     Mental Status:  {IP PT MENTAL STATUS:}    IV Access:  {[facility] IV ACCESS:881262522}    Nursing Mobility/ADLs:  Walking {CHP DME XYOL:747471073}  Transfer  {CHP DME YCRN:312968975}  Bathing  {CHP DME GMSX:629087979}  Dressing  {CHP DME QBXV:253659272}  Toileting  {CHP DME PFRV:779609193}  Feeding  {CHP DME AAOF:750148901}  Med Admin  {CHP DME JQYN:498763845}  Med Delivery   {[facility] MED Delivery:388580830}    Wound Care Documentation and Therapy:  Puncture 10/13/21 Abdomen Lateral;Left;Upper (Active)   April-wound Assessment Ecchymosis 10/13/21 2118   Closure Adhesive bandage; Sutures 10/13/21 2118   Drainage Amount Moderate 10/13/21 2118   Drainage Description Sanguinous 10/13/21 2118   Dressing/Treatment Foam 10/13/21 2118   Number of days: 1        Elimination:  Continence: Bowel: {YES / EB:75083}  Bladder: {YES / KO:57606}  Urinary Catheter: {Urinary Catheter:962846605}   Colostomy/Ileostomy/Ileal Conduit: {YES / YS:33353}       Date of Last BM: ***    Intake/Output Summary (Last 24 hours) at 10/15/2021 1233  Last data filed at 10/15/2021 0600  Gross per 24 hour   Intake 240 ml   Output 1310 ml   Net -1070 ml     I/O last 3 completed shifts: In: 720 [P.O.:720]  Out: 1310 [Urine:1250;  Chest Tube:60]    Safety Concerns:     [address] Safety Concerns:165504677}    Impairments/Disabilities:      [address] Impairments/Disabilities:963371381}    Nutrition Therapy:  Current Nutrition Therapy:   [address] Diet List:295232254}    Routes of Feeding: {CHP DME Other Feedings:044637402}  Liquids: {Slp liquid thickness:69203}  Daily Fluid Restriction: {CHP DME Yes amt example:479889443}  Last Modified Barium Swallow with Video (Video Swallowing Test): {Done Not Done LETP:240274212}    Treatments at the Time of Hospital Discharge:   Respiratory Treatments: ***  Oxygen Therapy:  {Therapy; copd oxygen:03110}  Ventilator:    { CC Vent EXYT:747122429}    Rehab Therapies: {THERAPEUTIC INTERVENTION:8257527416}  Weight Bearing Status/Restrictions: [address] Weight Bearin}  Other Medical Equipment (for information only, NOT a DME order): {EQUIPMENT:142888424}  Other Treatments: ***    Patient's personal belongings (please select all that are sent with patient):  {CHP DME Belongings:588806839}    RN SIGNATURE:  {Esignature:520781674}    CASE MANAGEMENT/SOCIAL WORK SECTION    Inpatient Status Date: ***    Readmission Risk Assessment Score:  Readmission Risk              Risk of Unplanned Readmission:  8           Discharging to Facility/ Agency   Name:   Address:  Phone:  Fax:    Dialysis Facility (if applicable)   Name:  Address:  Dialysis Schedule:  Phone:  Fax:    / signature: {Esignature:561612755}    PHYSICIAN SECTION    Prognosis: Good    Condition at Discharge: Stable    Rehab Potential (if transferring to Rehab): {Prognosis:3972986179}    Recommended Labs or Other Treatments After Discharge: ***    Physician Certification: I certify the above information and transfer of Davy Hamilton  is necessary for the continuing treatment of the diagnosis listed and that he requires [address] for less 30 days.      Update Admission H&P: No change in H&P    PHYSICIAN SIGNATURE:  Electronically signed by Sil Ponce DO on 10/16/21 at 4:36 PM EDT

## 2021-10-16 ENCOUNTER — APPOINTMENT (OUTPATIENT)
Dept: GENERAL RADIOLOGY | Age: 56
DRG: 200 | End: 2021-10-16
Payer: COMMERCIAL

## 2021-10-16 LAB
ABSOLUTE EOS #: 0.08 K/UL (ref 0–0.44)
ABSOLUTE IMMATURE GRANULOCYTE: 0.03 K/UL (ref 0–0.3)
ABSOLUTE LYMPH #: 1.34 K/UL (ref 1.1–3.7)
ABSOLUTE MONO #: 0.63 K/UL (ref 0.1–1.2)
ANION GAP SERPL CALCULATED.3IONS-SCNC: 12 MMOL/L (ref 9–17)
BASOPHILS # BLD: 1 % (ref 0–2)
BASOPHILS ABSOLUTE: 0.04 K/UL (ref 0–0.2)
BUN BLDV-MCNC: 13 MG/DL (ref 6–20)
BUN/CREAT BLD: ABNORMAL (ref 9–20)
CALCIUM SERPL-MCNC: 8.8 MG/DL (ref 8.6–10.4)
CHLORIDE BLD-SCNC: 103 MMOL/L (ref 98–107)
CO2: 21 MMOL/L (ref 20–31)
CREAT SERPL-MCNC: 0.67 MG/DL (ref 0.7–1.2)
DIFFERENTIAL TYPE: ABNORMAL
EOSINOPHILS RELATIVE PERCENT: 1 % (ref 1–4)
GFR AFRICAN AMERICAN: >60 ML/MIN
GFR NON-AFRICAN AMERICAN: >60 ML/MIN
GFR SERPL CREATININE-BSD FRML MDRD: ABNORMAL ML/MIN/{1.73_M2}
GFR SERPL CREATININE-BSD FRML MDRD: ABNORMAL ML/MIN/{1.73_M2}
GLUCOSE BLD-MCNC: 97 MG/DL (ref 70–99)
HCT VFR BLD CALC: 38.7 % (ref 40.7–50.3)
HEMOGLOBIN: 12.3 G/DL (ref 13–17)
IMMATURE GRANULOCYTES: 1 %
LYMPHOCYTES # BLD: 21 % (ref 24–43)
MCH RBC QN AUTO: 29.5 PG (ref 25.2–33.5)
MCHC RBC AUTO-ENTMCNC: 31.8 G/DL (ref 28.4–34.8)
MCV RBC AUTO: 92.8 FL (ref 82.6–102.9)
MONOCYTES # BLD: 10 % (ref 3–12)
NRBC AUTOMATED: 0 PER 100 WBC
PDW BLD-RTO: 13.6 % (ref 11.8–14.4)
PLATELET # BLD: 172 K/UL (ref 138–453)
PLATELET ESTIMATE: ABNORMAL
PMV BLD AUTO: 9.3 FL (ref 8.1–13.5)
POTASSIUM SERPL-SCNC: 4.2 MMOL/L (ref 3.7–5.3)
RBC # BLD: 4.17 M/UL (ref 4.21–5.77)
RBC # BLD: ABNORMAL 10*6/UL
SEG NEUTROPHILS: 66 % (ref 36–65)
SEGMENTED NEUTROPHILS ABSOLUTE COUNT: 4.26 K/UL (ref 1.5–8.1)
SODIUM BLD-SCNC: 136 MMOL/L (ref 135–144)
WBC # BLD: 6.4 K/UL (ref 3.5–11.3)
WBC # BLD: ABNORMAL 10*3/UL

## 2021-10-16 PROCEDURE — 36415 COLL VENOUS BLD VENIPUNCTURE: CPT

## 2021-10-16 PROCEDURE — 85025 COMPLETE CBC W/AUTO DIFF WBC: CPT

## 2021-10-16 PROCEDURE — 6360000002 HC RX W HCPCS

## 2021-10-16 PROCEDURE — 71045 X-RAY EXAM CHEST 1 VIEW: CPT

## 2021-10-16 PROCEDURE — 80048 BASIC METABOLIC PNL TOTAL CA: CPT

## 2021-10-16 PROCEDURE — 6370000000 HC RX 637 (ALT 250 FOR IP): Performed by: STUDENT IN AN ORGANIZED HEALTH CARE EDUCATION/TRAINING PROGRAM

## 2021-10-16 PROCEDURE — 2580000003 HC RX 258: Performed by: STUDENT IN AN ORGANIZED HEALTH CARE EDUCATION/TRAINING PROGRAM

## 2021-10-16 PROCEDURE — 2060000002 HC BURN ICU INTERMEDIATE R&B

## 2021-10-16 RX ADMIN — GABAPENTIN 300 MG: 300 CAPSULE ORAL at 11:35

## 2021-10-16 RX ADMIN — ACETAMINOPHEN 1000 MG: 500 TABLET ORAL at 04:14

## 2021-10-16 RX ADMIN — AMOXICILLIN AND CLAVULANATE POTASSIUM 1 TABLET: 875; 125 TABLET, FILM COATED ORAL at 09:18

## 2021-10-16 RX ADMIN — AMOXICILLIN AND CLAVULANATE POTASSIUM 1 TABLET: 875; 125 TABLET, FILM COATED ORAL at 21:14

## 2021-10-16 RX ADMIN — SODIUM CHLORIDE, PRESERVATIVE FREE 10 ML: 5 INJECTION INTRAVENOUS at 21:14

## 2021-10-16 RX ADMIN — METHOCARBAMOL TABLETS 750 MG: 500 TABLET, COATED ORAL at 11:35

## 2021-10-16 RX ADMIN — GABAPENTIN 300 MG: 300 CAPSULE ORAL at 04:14

## 2021-10-16 RX ADMIN — GABAPENTIN 300 MG: 300 CAPSULE ORAL at 21:14

## 2021-10-16 RX ADMIN — ACETAMINOPHEN 1000 MG: 500 TABLET ORAL at 11:35

## 2021-10-16 RX ADMIN — ENOXAPARIN SODIUM 30 MG: 30 INJECTION SUBCUTANEOUS at 21:14

## 2021-10-16 RX ADMIN — ACETAMINOPHEN 1000 MG: 500 TABLET ORAL at 21:14

## 2021-10-16 RX ADMIN — ENOXAPARIN SODIUM 30 MG: 30 INJECTION SUBCUTANEOUS at 09:17

## 2021-10-16 RX ADMIN — METHOCARBAMOL TABLETS 750 MG: 500 TABLET, COATED ORAL at 04:14

## 2021-10-16 RX ADMIN — SODIUM CHLORIDE, PRESERVATIVE FREE 10 ML: 5 INJECTION INTRAVENOUS at 09:18

## 2021-10-16 RX ADMIN — POLYETHYLENE GLYCOL 3350 17 G: 17 POWDER, FOR SOLUTION ORAL at 09:17

## 2021-10-16 RX ADMIN — METHOCARBAMOL TABLETS 750 MG: 500 TABLET, COATED ORAL at 21:14

## 2021-10-16 ASSESSMENT — PAIN SCALES - GENERAL
PAINLEVEL_OUTOF10: 1
PAINLEVEL_OUTOF10: 1
PAINLEVEL_OUTOF10: 0

## 2021-10-16 NOTE — PLAN OF CARE
Problem: Pain:  Goal: Pain level will decrease  Description: Pain level will decrease  10/16/2021 1638 by Janessa Silvestre RN  Outcome: Ongoing  10/16/2021 0546 by Marivel Saini RN  Outcome: Ongoing  Goal: Control of acute pain  Description: Control of acute pain  10/16/2021 1638 by Janessa Silvestre RN  Outcome: Ongoing  10/16/2021 0546 by Marivel Saini RN  Outcome: Ongoing  Goal: Control of chronic pain  Description: Control of chronic pain  10/16/2021 1638 by Janessa Silvestre RN  Outcome: Ongoing  10/16/2021 0546 by Marivel Saini RN  Outcome: Ongoing     Problem: Falls - Risk of:  Goal: Will remain free from falls  Description: Will remain free from falls  10/16/2021 1638 by Janessa Silvestre RN  Outcome: Ongoing  10/16/2021 0546 by Marivel Saini RN  Outcome: Ongoing  Goal: Absence of physical injury  Description: Absence of physical injury  10/16/2021 1638 by Janessa Silvestre RN  Outcome: Ongoing  10/16/2021 0546 by Marivel Saini RN  Outcome: Ongoing     Problem: Tissue Perfusion - Cardiopulmonary, Altered:  Goal: Absence of angina  Description: Absence of angina  10/16/2021 1638 by Janessa Silvestre RN  Outcome: Ongoing  10/16/2021 0546 by Marivel Saini RN  Outcome: Ongoing  Goal: Hemodynamic stability will improve  Description: Hemodynamic stability will improve  10/16/2021 1638 by Janessa Silvestre RN  Outcome: Ongoing  10/16/2021 0546 by Marivel Saini RN  Outcome: Ongoing

## 2021-10-16 NOTE — PROGRESS NOTES
PROGRESS NOTE          PATIENT NAME: Marsha Wheeler  MEDICAL RECORD NO. [id number]  DATE: 10/16/2021  SURGEON: Dr. Ellis Marr: No primary care provider on file. HD: # 3    ASSESSMENT    Patient Active Problem List   Diagnosis    Trauma    Fracture of right clavicle    Fracture of left scapula    Traumatic pneumothorax    Fracture of multiple ribs of left side    Pneumomediastinum (HCC)    Facial fracture [facility])       MEDICAL DECISION MAKING AND PLAN    Multiple Rib fractures-right 1,2,5, left 5  L pneumothorax   CXR this a.m showed improved left pneumothorax, chest tube pulled, follow-up repeat chest x-ray 2200   Encourage IS    L orbital wall fracture  L zygomatic process fracture  L maxillary sinus wall fracture   Plastics consulted-sinus precautions, Augmentin    Right clavicle fracture  L scapula fracture   Ortho consulted   Sling BUE   NWB BUE   Follow-up with Dr. Sharol Lombard 7 to 10 days after injury    Pain management   Continue Tylenol, neurontin, robaxin   Shannan PRN   DC'd ketamine gtt 10/15    DVT proph   Continue Lovenox    GI   Continue general diet   Continue glycolax    Dispo   PT/OT      Chief Complaint: \"I'm sore\"    SUBJECTIVE    Marsha Wheeler was seen and examined at bedside. Patient sitting in bed comfortably watching TV. Denies any shortness of breath, chest pain. Tolerating p.o. but notes has mostly been consuming liquids. OBJECTIVE  VITALS: Temp: Temp: 98.9 °F (37.2 °C)Temp  Av.3 °F (36.8 °C)  Min: 97.1 °F (36.2 °C)  Max: 99 °F (40.2 °C) BP Systolic (45HDJ), SSW:944 , Min:123 , GIE:056   Diastolic (79YII), YFW:33, Min:78, Max:99   Pulse Pulse  Av  Min: 65  Max: 72 Resp Resp  Av.3  Min: 14  Max: 22 Pulse ox SpO2  Av %  Min: 92 %  Max: 95 %  GENERAL: alert, no distress  NEURO: oriented  HEENT: pupils equal round and reactive  LUNGS: unlabored. L sided chest tube removed, occlusive dressing placed.   HEART: normal rate and regular rhythm  ABDOMEN: soft, non-tender, non-distended, bowel sounds present in all 4 quadrants and no guarding or peritoneal signs present  EXTREMITY: BUE slings in place    I/O last 3 completed shifts:  In: -   Out: 1150 [Urine:1000; Chest Tube:150]    Drain/tube output: In: -   Out: 1150 [Urine:1000]    LAB:  CBC:   Recent Labs     10/14/21  0750 10/15/21  0341 10/16/21  0348   WBC 7.7 7.0 6.4   HGB 13.4 12.8* 12.3*   HCT 41.4 40.0* 38.7*   MCV 95.6 91.5 92.8    156 172     BMP:   Recent Labs     10/14/21  0750 10/15/21  0341 10/16/21  0348    135 136   K 4.2 4.3 4.2    101 103   CO2 21 23 21   BUN 11 9 13   CREATININE 0.70 0.62* 0.67*   GLUCOSE 121* 112* 97     COAGS: No results for input(s): APTT, PROT, INR in the last 72 hours. RADIOLOGY:  XR CHEST PORTABLE   Final Result   No change in mild enlargement of the cardiac silhouette. No change in position of left-sided chest tube or small pneumothorax   projecting at the left lung base and potentially left apex. Mild overlying   atelectasis or infiltrate left lung base again noted. XR CHEST PORTABLE   Final Result   Bibasilar effusions with trace left apical pneumothorax. Subcutaneous emphysema along the left lateral chest wall and in the   supraclavicular regions. XR CHEST PORTABLE   Final Result   Small bilateral effusions with left apical pneumothorax and left basilar   chest tube. Subcutaneous emphysema along the left lateral chest wall and in the neck   region bilaterally. XR CHEST PORTABLE   Final Result   Malposition chest tube removed from the left. Other 2 chest tubes on the   left unchanged. Extensive subcutaneous emphysema unchanged. No definite   pneumothorax. Mild interstitial infiltrates and small left effusion. Multiple rib fractures on the left and right clavicular fracture. XR CHEST PORTABLE   Final Result   Chest tube outside the chest wall cavity on the left.   Questionable minute   left apical pneumothorax. Extensive subcutaneous emphysema decreases   sensitivity. Possible mild edema or pneumonitis. CT 3D RECONSTRUCTION   Final Result   1. Moderate left-sided pneumothorax with left apical, anterior, anterior   inferior, lateral and inferior components. Moderate left-sided   hydropneumothorax with moderate pleural fluid in the inferior left pleural   space. Compressive atelectasis in the left lower lobe, posterior left upper   lobe and lingula. Concurrent/underlying pulmonary contusion is also possible. 2. Multiple acute displaced left-sided rib fracture deformities involving the   anterior, anterolateral, lateral, posterolateral and posterior ribs. 3. Severely comminuted fracture at the junction of the left glenoid and   scapular body with extension of fracture components into the lateral left   scapular body. 4. Extensive subcutaneous emphysema throughout the left neck, chest wall and   left axilla. Pneumomediastinum. 5. Mild degenerative changes of the left AC and sternoclavicular joints. 6. Moderate to severe left glenohumeral osteoarthrosis. CT SHOULDER LEFT WO CONTRAST   Final Result   1. Moderate left-sided pneumothorax with left apical, anterior, anterior   inferior, lateral and inferior components. Moderate left-sided   hydropneumothorax with moderate pleural fluid in the inferior left pleural   space. Compressive atelectasis in the left lower lobe, posterior left upper   lobe and lingula. Concurrent/underlying pulmonary contusion is also possible. 2. Multiple acute displaced left-sided rib fracture deformities involving the   anterior, anterolateral, lateral, posterolateral and posterior ribs. 3. Severely comminuted fracture at the junction of the left glenoid and   scapular body with extension of fracture components into the lateral left   scapular body.    4. Extensive subcutaneous emphysema throughout the left neck, chest wall and confirmed the key elements of the medical history and physical exam. I have seen and examined the pt. I have discussed the findings, established the care plan and recommendations with Resident, GCS RN, bedside nurse.         Naeem Cherry DO  10/18/2021  1:00 PM

## 2021-10-16 NOTE — PLAN OF CARE
Problem: Pain:  Goal: Pain level will decrease  Description: Pain level will decrease  Outcome: Ongoing  Goal: Control of acute pain  Description: Control of acute pain  Outcome: Ongoing  Goal: Control of chronic pain  Description: Control of chronic pain  Outcome: Ongoing     Problem: Falls - Risk of:  Goal: Will remain free from falls  Description: Will remain free from falls  Outcome: Ongoing  Goal: Absence of physical injury  Description: Absence of physical injury  Outcome: Ongoing     Problem: Tissue Perfusion - Cardiopulmonary, Altered:  Goal: Absence of angina  Description: Absence of angina  Outcome: Ongoing  Goal: Hemodynamic stability will improve  Description: Hemodynamic stability will improve  Outcome: Ongoing

## 2021-10-17 ENCOUNTER — APPOINTMENT (OUTPATIENT)
Dept: GENERAL RADIOLOGY | Age: 56
DRG: 200 | End: 2021-10-17
Payer: COMMERCIAL

## 2021-10-17 VITALS
SYSTOLIC BLOOD PRESSURE: 139 MMHG | HEART RATE: 83 BPM | WEIGHT: 230 LBS | RESPIRATION RATE: 18 BRPM | HEIGHT: 72 IN | OXYGEN SATURATION: 95 % | BODY MASS INDEX: 31.15 KG/M2 | DIASTOLIC BLOOD PRESSURE: 89 MMHG | TEMPERATURE: 97.8 F

## 2021-10-17 LAB
ABSOLUTE EOS #: 0.16 K/UL (ref 0–0.44)
ABSOLUTE IMMATURE GRANULOCYTE: 0.05 K/UL (ref 0–0.3)
ABSOLUTE LYMPH #: 1.22 K/UL (ref 1.1–3.7)
ABSOLUTE MONO #: 0.58 K/UL (ref 0.1–1.2)
ANION GAP SERPL CALCULATED.3IONS-SCNC: 12 MMOL/L (ref 9–17)
BASOPHILS # BLD: 1 % (ref 0–2)
BASOPHILS ABSOLUTE: 0.05 K/UL (ref 0–0.2)
BUN BLDV-MCNC: 14 MG/DL (ref 6–20)
BUN/CREAT BLD: ABNORMAL (ref 9–20)
CALCIUM SERPL-MCNC: 9 MG/DL (ref 8.6–10.4)
CHLORIDE BLD-SCNC: 99 MMOL/L (ref 98–107)
CO2: 23 MMOL/L (ref 20–31)
CREAT SERPL-MCNC: 0.65 MG/DL (ref 0.7–1.2)
DIFFERENTIAL TYPE: ABNORMAL
EOSINOPHILS RELATIVE PERCENT: 3 % (ref 1–4)
GFR AFRICAN AMERICAN: >60 ML/MIN
GFR NON-AFRICAN AMERICAN: >60 ML/MIN
GFR SERPL CREATININE-BSD FRML MDRD: ABNORMAL ML/MIN/{1.73_M2}
GFR SERPL CREATININE-BSD FRML MDRD: ABNORMAL ML/MIN/{1.73_M2}
GLUCOSE BLD-MCNC: 108 MG/DL (ref 70–99)
HCT VFR BLD CALC: 40 % (ref 40.7–50.3)
HEMOGLOBIN: 12.7 G/DL (ref 13–17)
IMMATURE GRANULOCYTES: 1 %
LYMPHOCYTES # BLD: 20 % (ref 24–43)
MCH RBC QN AUTO: 28.9 PG (ref 25.2–33.5)
MCHC RBC AUTO-ENTMCNC: 31.8 G/DL (ref 28.4–34.8)
MCV RBC AUTO: 91.1 FL (ref 82.6–102.9)
MONOCYTES # BLD: 10 % (ref 3–12)
NRBC AUTOMATED: 0 PER 100 WBC
PDW BLD-RTO: 13.7 % (ref 11.8–14.4)
PLATELET # BLD: 184 K/UL (ref 138–453)
PLATELET ESTIMATE: ABNORMAL
PMV BLD AUTO: 9 FL (ref 8.1–13.5)
POTASSIUM SERPL-SCNC: 4 MMOL/L (ref 3.7–5.3)
RBC # BLD: 4.39 M/UL (ref 4.21–5.77)
RBC # BLD: ABNORMAL 10*6/UL
SEG NEUTROPHILS: 65 % (ref 36–65)
SEGMENTED NEUTROPHILS ABSOLUTE COUNT: 4 K/UL (ref 1.5–8.1)
SODIUM BLD-SCNC: 134 MMOL/L (ref 135–144)
WBC # BLD: 6.1 K/UL (ref 3.5–11.3)
WBC # BLD: ABNORMAL 10*3/UL

## 2021-10-17 PROCEDURE — 36415 COLL VENOUS BLD VENIPUNCTURE: CPT

## 2021-10-17 PROCEDURE — 6370000000 HC RX 637 (ALT 250 FOR IP)

## 2021-10-17 PROCEDURE — 85025 COMPLETE CBC W/AUTO DIFF WBC: CPT

## 2021-10-17 PROCEDURE — 80048 BASIC METABOLIC PNL TOTAL CA: CPT

## 2021-10-17 PROCEDURE — 2580000003 HC RX 258: Performed by: STUDENT IN AN ORGANIZED HEALTH CARE EDUCATION/TRAINING PROGRAM

## 2021-10-17 PROCEDURE — 97110 THERAPEUTIC EXERCISES: CPT

## 2021-10-17 PROCEDURE — 6370000000 HC RX 637 (ALT 250 FOR IP): Performed by: STUDENT IN AN ORGANIZED HEALTH CARE EDUCATION/TRAINING PROGRAM

## 2021-10-17 PROCEDURE — 97116 GAIT TRAINING THERAPY: CPT

## 2021-10-17 PROCEDURE — 71045 X-RAY EXAM CHEST 1 VIEW: CPT

## 2021-10-17 PROCEDURE — 97530 THERAPEUTIC ACTIVITIES: CPT

## 2021-10-17 PROCEDURE — 6360000002 HC RX W HCPCS

## 2021-10-17 RX ORDER — AMOXICILLIN AND CLAVULANATE POTASSIUM 875; 125 MG/1; MG/1
1 TABLET, FILM COATED ORAL EVERY 12 HOURS SCHEDULED
Qty: 5 TABLET | Refills: 0 | Status: SHIPPED | OUTPATIENT
Start: 2021-10-17 | End: 2021-10-20

## 2021-10-17 RX ORDER — METHOCARBAMOL 750 MG/1
750 TABLET, FILM COATED ORAL EVERY 8 HOURS
Qty: 30 TABLET | Refills: 0 | Status: SHIPPED | OUTPATIENT
Start: 2021-10-17 | End: 2021-10-27

## 2021-10-17 RX ORDER — SENNA AND DOCUSATE SODIUM 50; 8.6 MG/1; MG/1
2 TABLET, FILM COATED ORAL DAILY
Status: DISCONTINUED | OUTPATIENT
Start: 2021-10-17 | End: 2021-10-17 | Stop reason: HOSPADM

## 2021-10-17 RX ADMIN — GABAPENTIN 300 MG: 300 CAPSULE ORAL at 04:18

## 2021-10-17 RX ADMIN — AMOXICILLIN AND CLAVULANATE POTASSIUM 1 TABLET: 875; 125 TABLET, FILM COATED ORAL at 09:50

## 2021-10-17 RX ADMIN — POLYETHYLENE GLYCOL 3350 17 G: 17 POWDER, FOR SOLUTION ORAL at 09:50

## 2021-10-17 RX ADMIN — ACETAMINOPHEN 1000 MG: 500 TABLET ORAL at 04:18

## 2021-10-17 RX ADMIN — ACETAMINOPHEN 1000 MG: 500 TABLET ORAL at 13:48

## 2021-10-17 RX ADMIN — DOCUSATE SODIUM 50MG AND SENNOSIDES 8.6MG 2 TABLET: 8.6; 5 TABLET, FILM COATED ORAL at 09:49

## 2021-10-17 RX ADMIN — METHOCARBAMOL TABLETS 750 MG: 500 TABLET, COATED ORAL at 04:19

## 2021-10-17 RX ADMIN — SODIUM CHLORIDE, PRESERVATIVE FREE 10 ML: 5 INJECTION INTRAVENOUS at 09:49

## 2021-10-17 RX ADMIN — ENOXAPARIN SODIUM 30 MG: 30 INJECTION SUBCUTANEOUS at 09:50

## 2021-10-17 ASSESSMENT — PAIN SCALES - GENERAL
PAINLEVEL_OUTOF10: 0
PAINLEVEL_OUTOF10: 1
PAINLEVEL_OUTOF10: 0
PAINLEVEL_OUTOF10: 0

## 2021-10-17 NOTE — PLAN OF CARE
Problem: Pain:  Goal: Pain level will decrease  Description: Pain level will decrease  10/17/2021 0403 by Sly Juares RN  Outcome: Ongoing  10/16/2021 1638 by Pretty Sorto RN  Outcome: Ongoing  Goal: Control of acute pain  Description: Control of acute pain  10/17/2021 0403 by Sly Juares RN  Outcome: Ongoing  10/16/2021 1638 by Pretty Sorto RN  Outcome: Ongoing  Goal: Control of chronic pain  Description: Control of chronic pain  10/17/2021 0403 by Sly Juares RN  Outcome: Ongoing  10/16/2021 1638 by Pretty Sorto RN  Outcome: Ongoing     Problem: Falls - Risk of:  Goal: Will remain free from falls  Description: Will remain free from falls  10/17/2021 0403 by lSy Juares RN  Outcome: Ongoing  10/16/2021 1638 by Pretty Sorto RN  Outcome: Ongoing  Goal: Absence of physical injury  Description: Absence of physical injury  10/17/2021 0403 by Sly Juares RN  Outcome: Ongoing  10/16/2021 1638 by Pretty Sorto RN  Outcome: Ongoing     Problem: Tissue Perfusion - Cardiopulmonary, Altered:  Goal: Absence of angina  Description: Absence of angina  10/17/2021 0403 by Sly Juares RN  Outcome: Ongoing  10/16/2021 1638 by Pretty Sorto RN  Outcome: Ongoing  Goal: Hemodynamic stability will improve  Description: Hemodynamic stability will improve  10/17/2021 0403 by Sly Juares RN  Outcome: Ongoing  10/16/2021 1638 by Pretty Sorto RN  Outcome: Ongoing

## 2021-10-17 NOTE — PROGRESS NOTES
not wear L sling)  Position Activity Restriction  Other position/activity restrictions: Up w/A, sinus precautions (no straws), s/p facial fx, R clavical fx, L scapula fx, 1,2,5 R rib fx. Chest tube. Subjective   General  Chart Reviewed: Yes  Response To Previous Treatment: Patient with no complaints from previous session. Family / Caregiver Present: Yes (Daughter)  Subjective  Subjective: RN and pt agreeable to PT. pt agreeable and pleasant.  pt in bed on arrival  General Comment  Comments: Pt retired to chair, daughter present  Pain Screening  Patient Currently in Pain: Denies (Pt states some discomfort in Ribs upon movement which goes away when not instigated)  Pain Assessment  Pain Assessment: 0-10  Pain Level: 0  Patient's Stated Pain Goal: No pain  Vital Signs  Patient Currently in Pain: Denies (Pt states some discomfort in Ribs upon movement which goes away when not instigated)       Orientation  Orientation  Overall Orientation Status: Within Normal Limits  Cognition   Cognition  Overall Cognitive Status: WFL  Objective   Bed mobility  Supine to Sit: Minimal assistance  Sit to Supine: Unable to assess  Scooting: Minimal assistance;Stand by assistance (therapist tactcally and verbally demonstrated weight shifting technique good return)  Comment: HOB elevated, therapist donned R UE sling for comfort and to reduce urge to use BUES, pt very focused t/o treatment following wb protocals, pt able to advance BLEs, with ther apist MIN assisting with trunk, pt less pain therfore able to Bear [place] core muscles  Transfers  Sit to Stand: Contact guard assistance (Pt good carryover when educated on proper body mechanics, w/ forward lean and weight through heels)  Stand to sit: Minimal Assistance (Pt required minimal assist w/ descent, pt good carryover when educated on proper body mechanics)  Bed to Chair:  (toile transfers, and transfers from bed and recliner chair)  Comment: STS x6 performed to Improve safe body mechanics and effeciancy w/ tranfers. Pt very focused on correct tech and not using extrenities when transfering  Ambulation  Ambulation?: Yes  Ambulation 1  Surface: level tile  Device: No Device  Assistance: Contact guard assistance  Quality of Gait: increased steppage RLE, WBOS, unsteady at times R arm in sling  Gait Deviations: Increased SYDNEE; Deviated path  Distance: 120ft x2  Comments: Standinging rest breaks x2 ,  Pt very motivated pt staes he wished he could wak for 4 hours, best he's felt in forever  Stairs/Curb  Stairs?: No     Balance  Posture: Good  Sitting - Static: Good  Sitting - Dynamic: Good;-  Standing - Static: Fair;+  Standing - Dynamic: Fair  Comments: Assessed without AD  Exercises  Gluteal Sets: x20  Knee Long Arc Quad: x20  Ankle Pumps: 20 reps  Core strengthing, seated EOB x10 and core/ABD bracing w/  Pillow x5  Comments: 10 min static stand        AM-PAC Score  AM-PAC Inpatient Mobility Raw Score : 14 (10/17/21 1312)  AM-PAC Inpatient T-Scale Score : 38.1 (10/17/21 1312)  Mobility Inpatient CMS 0-100% Score: 61.29 (10/17/21 1312)  Mobility Inpatient CMS G-Code Modifier : CL (10/17/21 1312)          Goals  Short term goals  Time Frame for Short term goals: 14 visits  Short term goal 1: Complete bed mobility with mod I NWB BUE  Short term goal 2: Complete transfers with mod I NWB BUE  Short term goal 3: Complete 300 ft of gait with mod I NWB BUE  Short term goal 4: Participate in 30 minutes of therapy to promote endurance    Plan    Plan  Times per week: 6-7x  Current Treatment Recommendations: Strengthening, Transfer Training, Endurance Training, Neuromuscular Re-education, Patient/Caregiver Education & Training, ADL/Self-care Training, Equipment Evaluation, Education, & procurement, Balance Training, IADL Training, Gait Training, Home Exercise Program, Functional Mobility Training, Stair training, Safety Education & Training  Safety Devices  Type of devices:  All fall risk precautions in place, Gait belt, Call light within reach, Left in bed, Left in chair, Nurse notified  Restraints  Initially in place: No     Therapy Time   Individual Concurrent Group Co-treatment   Time In 0850         Time Out 0950         Minutes 60         Timed Code Treatment Minutes: 1625 Radha Rivera, PTA

## 2021-10-17 NOTE — PLAN OF CARE
Problem: Pain:  Goal: Pain level will decrease  Description: Pain level will decrease  10/17/2021 1533 by Blair Shah RN  Outcome: Completed  10/17/2021 1533 by Blair Shah RN  Outcome: Ongoing  10/17/2021 0403 by Jana Davies RN  Outcome: Ongoing  Goal: Control of acute pain  Description: Control of acute pain  10/17/2021 1533 by Blair Shah RN  Outcome: Completed  10/17/2021 1533 by Blair Shah RN  Outcome: Ongoing  10/17/2021 0403 by Jana Davies RN  Outcome: Ongoing  Goal: Control of chronic pain  Description: Control of chronic pain  10/17/2021 1533 by Blair Shah RN  Outcome: Completed  10/17/2021 1533 by Blair Shah RN  Outcome: Ongoing  10/17/2021 0403 by Jana Davies RN  Outcome: Ongoing     Problem: Falls - Risk of:  Goal: Will remain free from falls  Description: Will remain free from falls  10/17/2021 1533 by Blair Shah RN  Outcome: Completed  10/17/2021 1533 by Blair Shah RN  Outcome: Ongoing  10/17/2021 0403 by Jana Davies RN  Outcome: Ongoing  Goal: Absence of physical injury  Description: Absence of physical injury  10/17/2021 1533 by Blair Shah RN  Outcome: Completed  10/17/2021 1533 by Blair Shah RN  Outcome: Ongoing  10/17/2021 0403 by Jana Davies RN  Outcome: Ongoing     Problem: Tissue Perfusion - Cardiopulmonary, Altered:  Goal: Absence of angina  Description: Absence of angina  10/17/2021 1533 by Blair Shah RN  Outcome: Completed  10/17/2021 1533 by Blair Shah RN  Outcome: Ongoing  10/17/2021 0403 by Jana Davies RN  Outcome: Ongoing  Goal: Hemodynamic stability will improve  Description: Hemodynamic stability will improve  10/17/2021 1533 by Blair Shah RN  Outcome: Completed  10/17/2021 1533 by Blair Shah RN  Outcome: Ongoing  10/17/2021 0403 by Jana Davies RN  Outcome: Ongoing

## 2021-10-17 NOTE — PROGRESS NOTES
PROGRESS NOTE          PATIENT NAME: Ignacia Bhandari  MEDICAL RECORD NO. [id number]  DATE: 10/17/2021  SURGEON: Dr. Mcgill Numbers: No primary care provider on file. HD: # 4    ASSESSMENT    Patient Active Problem List   Diagnosis    Trauma    Fracture of right clavicle    Fracture of left scapula    Traumatic pneumothorax    Fracture of multiple ribs of left side    Pneumomediastinum (HCC)    Facial fracture [facility])       MEDICAL DECISION MAKING AND PLAN    Multiple Rib fractures-right 1,2,5, left 5  L pneumothorax   Chest tube pulled yesterday, chest x-ray this a.m. showed persistent trace left apical pneumothorax   Encourage IS   98% on room air    L orbital wall fracture  L zygomatic process fracture  L maxillary sinus wall fracture   Plastics consulted-sinus precautions, Augmentin through 10/20    Right clavicle fracture  L scapula fracture   Ortho consulted   Sling BUE   NWB BUE   Follow-up with Dr. Diomedes Reynoso 7 to 10 days after injury    Pain management   Continue Tylenol, neurontin, robaxin   Shannan PRN d/c'd 10/17   DC'd ketamine gtt 10/15    DVT proph   Continue Lovenox    GI   Continue general diet   Continue glycolax, added senna    Dispo   PT/OT      Chief Complaint: \"I'm sore\"    SUBJECTIVE    Ignacia Bhandari was seen and examined at bedside. Patient denies any shortness of breath I-S . Denied any chest pain. Tolerating p.o. Passing gas but no BM. Not needed Shannan in the last couple days. Chest tube was pulled yesterday. OBJECTIVE  VITALS: Temp: Temp: 98.4 °F (36.9 °C)Temp  Av.2 °F (36.8 °C)  Min: 97.1 °F (36.2 °C)  Max: 98.9 °F (03.8 °C) BP Systolic (49XUU), MCW:366 , Min:123 , ULW:856   Diastolic (35EKB), SMT:11, Min:78, Max:99   Pulse Pulse  Av.6  Min: 59  Max: 70 Resp Resp  Av.4  Min: 15  Max: 20 Pulse ox SpO2  Av %  Min: 92 %  Max: 96 %  GENERAL: alert, no distress  NEURO: oriented  HEENT: pupils equal round and reactive  LUNGS: unlabored. Equal breath sounds. HEART: normal rate and regular rhythm  ABDOMEN: soft, non-tender, non-distended, bowel sounds present in all 4 quadrants and no guarding or peritoneal signs present  EXTREMITY: BUE slings in place    I/O last 3 completed shifts:  In: -   Out: 375 [Urine:300; Chest Tube:75]    Drain/tube output: In: -   Out: 375 [Urine:300]    LAB:  CBC:   Recent Labs     10/15/21  0341 10/16/21  0348 10/17/21  0537   WBC 7.0 6.4 6.1   HGB 12.8* 12.3* 12.7*   HCT 40.0* 38.7* 40.0*   MCV 91.5 92.8 91.1    172 184     BMP:   Recent Labs     10/15/21  0341 10/16/21  0348 10/17/21  0537    136 134*   K 4.3 4.2 4.0    103 99   CO2 23 21 23   BUN 9 13 14   CREATININE 0.62* 0.67* 0.65*   GLUCOSE 112* 97 108*     COAGS: No results for input(s): APTT, PROT, INR in the last 72 hours. RADIOLOGY:  XR CHEST PORTABLE   Final Result   1. No significant change in the appearance of the chest.   2. Persistent trace left apical pneumothorax. XR CHEST PORTABLE   Final Result   No change minute left apical pneumothorax status post chest tube removal.   Left lower lobe infiltrate and effusion unchanged. XR CHEST PORTABLE   Final Result   No change in mild enlargement of the cardiac silhouette. No change in position of left-sided chest tube or small pneumothorax   projecting at the left lung base and potentially left apex. Mild overlying   atelectasis or infiltrate left lung base again noted. XR CHEST PORTABLE   Final Result   Bibasilar effusions with trace left apical pneumothorax. Subcutaneous emphysema along the left lateral chest wall and in the   supraclavicular regions. XR CHEST PORTABLE   Final Result   Small bilateral effusions with left apical pneumothorax and left basilar   chest tube. Subcutaneous emphysema along the left lateral chest wall and in the neck   region bilaterally.          XR CHEST PORTABLE   Final Result   Malposition chest tube removed from the left. Other 2 chest tubes on the   left unchanged. Extensive subcutaneous emphysema unchanged. No definite   pneumothorax. Mild interstitial infiltrates and small left effusion. Multiple rib fractures on the left and right clavicular fracture. XR CHEST PORTABLE   Final Result   Chest tube outside the chest wall cavity on the left. Questionable minute   left apical pneumothorax. Extensive subcutaneous emphysema decreases   sensitivity. Possible mild edema or pneumonitis. CT 3D RECONSTRUCTION   Final Result   1. Moderate left-sided pneumothorax with left apical, anterior, anterior   inferior, lateral and inferior components. Moderate left-sided   hydropneumothorax with moderate pleural fluid in the inferior left pleural   space. Compressive atelectasis in the left lower lobe, posterior left upper   lobe and lingula. Concurrent/underlying pulmonary contusion is also possible. 2. Multiple acute displaced left-sided rib fracture deformities involving the   anterior, anterolateral, lateral, posterolateral and posterior ribs. 3. Severely comminuted fracture at the junction of the left glenoid and   scapular body with extension of fracture components into the lateral left   scapular body. 4. Extensive subcutaneous emphysema throughout the left neck, chest wall and   left axilla. Pneumomediastinum. 5. Mild degenerative changes of the left AC and sternoclavicular joints. 6. Moderate to severe left glenohumeral osteoarthrosis. CT SHOULDER LEFT WO CONTRAST   Final Result   1. Moderate left-sided pneumothorax with left apical, anterior, anterior   inferior, lateral and inferior components. Moderate left-sided   hydropneumothorax with moderate pleural fluid in the inferior left pleural   space. Compressive atelectasis in the left lower lobe, posterior left upper   lobe and lingula. Concurrent/underlying pulmonary contusion is also possible.    2. Multiple acute displaced left-sided rib fracture deformities involving the   anterior, anterolateral, lateral, posterolateral and posterior ribs. 3. Severely comminuted fracture at the junction of the left glenoid and   scapular body with extension of fracture components into the lateral left   scapular body. 4. Extensive subcutaneous emphysema throughout the left neck, chest wall and   left axilla. Pneumomediastinum. 5. Mild degenerative changes of the left AC and sternoclavicular joints. 6. Moderate to severe left glenohumeral osteoarthrosis. XR CHEST PORTABLE   Final Result   The pneumothorax seen on the previous examination is not well appreciated on   today's study. Soft tissue emphysema is seen greatest at the neck base and   left upper chest wall. Atelectatic changes. CT SHOULDER RIGHT WO CONTRAST   Final Result   An acute right scapular fracture is not identified. Mildly displaced right   clavicular fracture. Right 1st and 2nd rib fractures. Small subpleural hematoma. Tiny right-sided pneumothorax. Subcutaneous emphysema. Small right pleural   effusion. Moderate glenohumeral degenerative change. XR CLAVICLE RIGHT   Final Result   Mildly displaced mid shaft right clavicle fracture. Lateral right 2nd rib fracture. Soft tissue edema and base of neck soft tissue emphysema. Probable left apical pulmonary contusion. Evidence of old granulomatous disease. XR SHOULDER RIGHT (MIN 2 VIEWS)   Final Result   Right clavicle and rib fractures. Base of neck soft tissue emphysema. Surrounding soft tissue edema. XR SHOULDER LEFT (MIN 2 VIEWS)   Final Result   Left scapular and rib fractures. Soft tissue emphysema at the base of the neck and left lateral chest wall. Likely left upper lung contusion not mentioned above. XR CHEST PORTABLE   Final Result   Possible left basilar pneumothorax on this supine image.       Multiple visualized and suspected left rib fractures as well as comminuted   left scapular fracture. Soft tissue emphysema throughout the visualized base of neck and mid to upper   left chest wall. Brittney Yoo MD  10/17/21, 7:59 AM             Trauma Attending Marika Lopez      I have reviewed the above [facility] note(s) and confirmed the key elements of the medical history and physical exam. I have seen and examined the pt. I have discussed the findings, established the care plan and recommendations with Resident, GCS RN, bedside nurse.   Pt doing quite well prefers home   Chest xray still with some subq emphysema but improved   Has been up with PT/OT   Understands no driving   Will follow up for suture removal       Sil Ponce DO  10/18/2021  12:19 PM

## 2021-10-17 NOTE — PROGRESS NOTES
Pt and family educated to keep CT wound dressing clean, dry, and intact. Pt and family educated how to perform dressing change using petroleum guaze, fluff, and tegaderm if wet. Pt and family demontrated understanding. Pt given discharge instructions and all questions answered in full. Pts meds delivered to bedside. Pt given bag of supplied to perform dressing if needed. Pt wheeled to main entrance of hospital. Pt discharged safely with all belongings.

## 2021-10-17 NOTE — PROGRESS NOTES
CLINICAL PHARMACY NOTE: MEDS TO BEDS    Total # of Prescriptions Filled: 2   The following medications were delivered to the patient:  · Augmentin  · methocarbamol    Additional Documentation:    Paid with card.
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
## 2021-10-19 ENCOUNTER — OFFICE VISIT (OUTPATIENT)
Dept: SURGERY | Age: 56
End: 2021-10-19
Payer: COMMERCIAL

## 2021-10-19 ENCOUNTER — HOSPITAL ENCOUNTER (OUTPATIENT)
Age: 56
Discharge: HOME | End: 2021-10-21
Payer: COMMERCIAL

## 2021-10-19 ENCOUNTER — HOSPITAL ENCOUNTER (OUTPATIENT)
Dept: GENERAL RADIOLOGY | Age: 56
Discharge: HOME | End: 2021-10-21
Payer: COMMERCIAL

## 2021-10-19 VITALS
SYSTOLIC BLOOD PRESSURE: 138 MMHG | BODY MASS INDEX: 31.15 KG/M2 | HEIGHT: 72 IN | WEIGHT: 230 LBS | DIASTOLIC BLOOD PRESSURE: 70 MMHG

## 2021-10-19 DIAGNOSIS — Z51.89 VISIT FOR WOUND CHECK: Primary | ICD-10-CM

## 2021-10-19 DIAGNOSIS — J98.2 PNEUMOMEDIASTINUM (HCC): ICD-10-CM

## 2021-10-19 DIAGNOSIS — S27.0XXA TRAUMATIC PNEUMOTHORAX, INITIAL ENCOUNTER: ICD-10-CM

## 2021-10-19 PROCEDURE — 4004F PT TOBACCO SCREEN RCVD TLK: CPT | Performed by: SPECIALIST

## 2021-10-19 PROCEDURE — G8417 CALC BMI ABV UP PARAM F/U: HCPCS | Performed by: SPECIALIST

## 2021-10-19 PROCEDURE — 3017F COLORECTAL CA SCREEN DOC REV: CPT | Performed by: SPECIALIST

## 2021-10-19 PROCEDURE — 71046 X-RAY EXAM CHEST 2 VIEWS: CPT

## 2021-10-19 PROCEDURE — 99212 OFFICE O/P EST SF 10 MIN: CPT | Performed by: SPECIALIST

## 2021-10-19 PROCEDURE — 1111F DSCHRG MED/CURRENT MED MERGE: CPT | Performed by: SPECIALIST

## 2021-10-19 PROCEDURE — G8427 DOCREV CUR MEDS BY ELIG CLIN: HCPCS | Performed by: SPECIALIST

## 2021-10-19 PROCEDURE — G8484 FLU IMMUNIZE NO ADMIN: HCPCS | Performed by: SPECIALIST

## 2021-10-19 NOTE — PATIENT INSTRUCTIONS
Follow up with orthopedic surgery and plastic surgery as scheduled. Return to Emergency department for any sudden increased shortness of breath or chest pain.
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
## 2021-10-19 NOTE — LETTER
[facility] [address] [address]  Phone: [phone]  Fax: [phone]    Jaswinder Arroyo MD        October 19, 2021     Patient: Maddison Key   YOB: 1965   Date of Visit: 10/19/2021       To Whom It May Concern: It is my medical opinion that Jill Villarreal may return to work on 10/19/2021 doing remote work. .    If you have any questions or concerns, please don't hesitate to call.     Sincerely,        Jaswinder Arroyo MD

## 2021-10-20 ASSESSMENT — ENCOUNTER SYMPTOMS
EYE PAIN: 1
SHORTNESS OF BREATH: 0
ABDOMINAL PAIN: 0
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
## 2021-10-26 ENCOUNTER — OFFICE VISIT (OUTPATIENT)
Dept: BURN CARE | Age: 56
End: 2021-10-26
Payer: COMMERCIAL

## 2021-10-26 VITALS
DIASTOLIC BLOOD PRESSURE: 78 MMHG | WEIGHT: 220 LBS | BODY MASS INDEX: 29.8 KG/M2 | SYSTOLIC BLOOD PRESSURE: 132 MMHG | HEART RATE: 72 BPM | HEIGHT: 72 IN

## 2021-10-26 DIAGNOSIS — S02.85XA CLOSED FRACTURE OF ORBIT, INITIAL ENCOUNTER (HCC): Primary | ICD-10-CM

## 2021-10-26 DIAGNOSIS — S02.401A CLOSED FRACTURE OF MAXILLARY SINUS, INITIAL ENCOUNTER (HCC): ICD-10-CM

## 2021-10-26 PROCEDURE — 4004F PT TOBACCO SCREEN RCVD TLK: CPT | Performed by: PLASTIC SURGERY

## 2021-10-26 PROCEDURE — 3017F COLORECTAL CA SCREEN DOC REV: CPT | Performed by: PLASTIC SURGERY

## 2021-10-26 PROCEDURE — 1111F DSCHRG MED/CURRENT MED MERGE: CPT | Performed by: PLASTIC SURGERY

## 2021-10-26 PROCEDURE — 99202 OFFICE O/P NEW SF 15 MIN: CPT | Performed by: PLASTIC SURGERY

## 2021-10-26 PROCEDURE — G8427 DOCREV CUR MEDS BY ELIG CLIN: HCPCS | Performed by: PLASTIC SURGERY

## 2021-10-26 PROCEDURE — G8417 CALC BMI ABV UP PARAM F/U: HCPCS | Performed by: PLASTIC SURGERY

## 2021-10-26 PROCEDURE — G8484 FLU IMMUNIZE NO ADMIN: HCPCS | Performed by: PLASTIC SURGERY

## 2021-10-26 ASSESSMENT — ENCOUNTER SYMPTOMS: EYE PAIN: 0
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
